# Patient Record
Sex: FEMALE | Race: BLACK OR AFRICAN AMERICAN | NOT HISPANIC OR LATINO | URBAN - METROPOLITAN AREA
[De-identification: names, ages, dates, MRNs, and addresses within clinical notes are randomized per-mention and may not be internally consistent; named-entity substitution may affect disease eponyms.]

---

## 2020-08-31 ENCOUNTER — INPATIENT (INPATIENT)
Facility: HOSPITAL | Age: 65
LOS: 0 days | Discharge: HOME | DRG: 392 | End: 2020-09-01
Attending: GENERAL PRACTICE | Admitting: GENERAL PRACTICE
Payer: COMMERCIAL

## 2020-08-31 VITALS
SYSTOLIC BLOOD PRESSURE: 158 MMHG | RESPIRATION RATE: 16 BRPM | OXYGEN SATURATION: 97 % | WEIGHT: 238.98 LBS | DIASTOLIC BLOOD PRESSURE: 87 MMHG | HEART RATE: 87 BPM | TEMPERATURE: 98 F

## 2020-08-31 DIAGNOSIS — R06.02 SHORTNESS OF BREATH: ICD-10-CM

## 2020-08-31 DIAGNOSIS — R07.9 CHEST PAIN, UNSPECIFIED: ICD-10-CM

## 2020-08-31 DIAGNOSIS — E11.9 TYPE 2 DIABETES MELLITUS WITHOUT COMPLICATIONS: ICD-10-CM

## 2020-08-31 DIAGNOSIS — Z98.891 HISTORY OF UTERINE SCAR FROM PREVIOUS SURGERY: Chronic | ICD-10-CM

## 2020-08-31 DIAGNOSIS — I10 ESSENTIAL (PRIMARY) HYPERTENSION: ICD-10-CM

## 2020-08-31 DIAGNOSIS — Z29.9 ENCOUNTER FOR PROPHYLACTIC MEASURES, UNSPECIFIED: ICD-10-CM

## 2020-08-31 LAB
ALBUMIN SERPL ELPH-MCNC: 3.5 G/DL — SIGNIFICANT CHANGE UP (ref 3.5–5)
ALP SERPL-CCNC: 90 U/L — SIGNIFICANT CHANGE UP (ref 40–120)
ALT FLD-CCNC: 22 U/L DA — SIGNIFICANT CHANGE UP (ref 10–60)
ANION GAP SERPL CALC-SCNC: 5 MMOL/L — SIGNIFICANT CHANGE UP (ref 5–17)
APTT BLD: 27.9 SEC — SIGNIFICANT CHANGE UP (ref 27.5–35.5)
AST SERPL-CCNC: 13 U/L — SIGNIFICANT CHANGE UP (ref 10–40)
BILIRUB SERPL-MCNC: 0.7 MG/DL — SIGNIFICANT CHANGE UP (ref 0.2–1.2)
BUN SERPL-MCNC: 10 MG/DL — SIGNIFICANT CHANGE UP (ref 7–18)
CALCIUM SERPL-MCNC: 9.9 MG/DL — SIGNIFICANT CHANGE UP (ref 8.4–10.5)
CHLORIDE SERPL-SCNC: 104 MMOL/L — SIGNIFICANT CHANGE UP (ref 96–108)
CK MB BLD-MCNC: <0.9 % — SIGNIFICANT CHANGE UP (ref 0–3.5)
CK MB CFR SERPL CALC: <1 NG/ML — SIGNIFICANT CHANGE UP (ref 0–3.6)
CK SERPL-CCNC: 110 U/L — SIGNIFICANT CHANGE UP (ref 21–215)
CO2 SERPL-SCNC: 29 MMOL/L — SIGNIFICANT CHANGE UP (ref 22–31)
CREAT SERPL-MCNC: 0.94 MG/DL — SIGNIFICANT CHANGE UP (ref 0.5–1.3)
GLUCOSE BLDC GLUCOMTR-MCNC: 165 MG/DL — HIGH (ref 70–99)
GLUCOSE BLDC GLUCOMTR-MCNC: 286 MG/DL — HIGH (ref 70–99)
GLUCOSE BLDC GLUCOMTR-MCNC: 352 MG/DL — HIGH (ref 70–99)
GLUCOSE SERPL-MCNC: 219 MG/DL — HIGH (ref 70–99)
HCT VFR BLD CALC: 39.3 % — SIGNIFICANT CHANGE UP (ref 34.5–45)
HGB BLD-MCNC: 12 G/DL — SIGNIFICANT CHANGE UP (ref 11.5–15.5)
INR BLD: 1.17 RATIO — HIGH (ref 0.88–1.16)
MCHC RBC-ENTMCNC: 29.1 PG — SIGNIFICANT CHANGE UP (ref 27–34)
MCHC RBC-ENTMCNC: 30.5 GM/DL — LOW (ref 32–36)
MCV RBC AUTO: 95.2 FL — SIGNIFICANT CHANGE UP (ref 80–100)
NRBC # BLD: 0 /100 WBCS — SIGNIFICANT CHANGE UP (ref 0–0)
NT-PROBNP SERPL-SCNC: 62 PG/ML — SIGNIFICANT CHANGE UP (ref 0–125)
PLATELET # BLD AUTO: 204 K/UL — SIGNIFICANT CHANGE UP (ref 150–400)
POTASSIUM SERPL-MCNC: 3.7 MMOL/L — SIGNIFICANT CHANGE UP (ref 3.5–5.3)
POTASSIUM SERPL-SCNC: 3.7 MMOL/L — SIGNIFICANT CHANGE UP (ref 3.5–5.3)
PROT SERPL-MCNC: 7.8 G/DL — SIGNIFICANT CHANGE UP (ref 6–8.3)
PROTHROM AB SERPL-ACNC: 13.6 SEC — SIGNIFICANT CHANGE UP (ref 10.6–13.6)
RBC # BLD: 4.13 M/UL — SIGNIFICANT CHANGE UP (ref 3.8–5.2)
RBC # FLD: 12.6 % — SIGNIFICANT CHANGE UP (ref 10.3–14.5)
SARS-COV-2 RNA SPEC QL NAA+PROBE: SIGNIFICANT CHANGE UP
SODIUM SERPL-SCNC: 138 MMOL/L — SIGNIFICANT CHANGE UP (ref 135–145)
TROPONIN I SERPL-MCNC: <0.015 NG/ML — SIGNIFICANT CHANGE UP (ref 0–0.04)
TROPONIN I SERPL-MCNC: <0.015 NG/ML — SIGNIFICANT CHANGE UP (ref 0–0.04)
WBC # BLD: 9.48 K/UL — SIGNIFICANT CHANGE UP (ref 3.8–10.5)
WBC # FLD AUTO: 9.48 K/UL — SIGNIFICANT CHANGE UP (ref 3.8–10.5)

## 2020-08-31 PROCEDURE — 71045 X-RAY EXAM CHEST 1 VIEW: CPT | Mod: 26

## 2020-08-31 PROCEDURE — 99285 EMERGENCY DEPT VISIT HI MDM: CPT | Mod: 25

## 2020-08-31 PROCEDURE — 71275 CT ANGIOGRAPHY CHEST: CPT | Mod: 26

## 2020-08-31 RX ORDER — PANTOPRAZOLE SODIUM 20 MG/1
40 TABLET, DELAYED RELEASE ORAL
Refills: 0 | Status: DISCONTINUED | OUTPATIENT
Start: 2020-08-31 | End: 2020-09-01

## 2020-08-31 RX ORDER — INSULIN LISPRO 100/ML
VIAL (ML) SUBCUTANEOUS
Refills: 0 | Status: DISCONTINUED | OUTPATIENT
Start: 2020-08-31 | End: 2020-09-01

## 2020-08-31 RX ORDER — INSULIN LISPRO 100/ML
10 VIAL (ML) SUBCUTANEOUS
Refills: 0 | Status: DISCONTINUED | OUTPATIENT
Start: 2020-08-31 | End: 2020-09-01

## 2020-08-31 RX ORDER — INSULIN LISPRO 100/ML
VIAL (ML) SUBCUTANEOUS AT BEDTIME
Refills: 0 | Status: DISCONTINUED | OUTPATIENT
Start: 2020-08-31 | End: 2020-09-01

## 2020-08-31 RX ORDER — ATORVASTATIN CALCIUM 80 MG/1
40 TABLET, FILM COATED ORAL AT BEDTIME
Refills: 0 | Status: DISCONTINUED | OUTPATIENT
Start: 2020-08-31 | End: 2020-09-01

## 2020-08-31 RX ORDER — ACETAMINOPHEN 500 MG
650 TABLET ORAL EVERY 4 HOURS
Refills: 0 | Status: DISCONTINUED | OUTPATIENT
Start: 2020-08-31 | End: 2020-09-01

## 2020-08-31 RX ORDER — GLUCAGON INJECTION, SOLUTION 0.5 MG/.1ML
1 INJECTION, SOLUTION SUBCUTANEOUS ONCE
Refills: 0 | Status: DISCONTINUED | OUTPATIENT
Start: 2020-08-31 | End: 2020-09-01

## 2020-08-31 RX ORDER — ACETAZOLAMIDE 250 MG/1
500 TABLET ORAL
Refills: 0 | Status: DISCONTINUED | OUTPATIENT
Start: 2020-08-31 | End: 2020-09-01

## 2020-08-31 RX ORDER — ASPIRIN/CALCIUM CARB/MAGNESIUM 324 MG
81 TABLET ORAL DAILY
Refills: 0 | Status: DISCONTINUED | OUTPATIENT
Start: 2020-08-31 | End: 2020-09-01

## 2020-08-31 RX ORDER — KETOROLAC TROMETHAMINE 30 MG/ML
15 SYRINGE (ML) INJECTION EVERY 6 HOURS
Refills: 0 | Status: DISCONTINUED | OUTPATIENT
Start: 2020-08-31 | End: 2020-08-31

## 2020-08-31 RX ORDER — METOPROLOL TARTRATE 50 MG
12.5 TABLET ORAL
Refills: 0 | Status: DISCONTINUED | OUTPATIENT
Start: 2020-08-31 | End: 2020-09-01

## 2020-08-31 RX ORDER — DEXTROSE 50 % IN WATER 50 %
25 SYRINGE (ML) INTRAVENOUS ONCE
Refills: 0 | Status: DISCONTINUED | OUTPATIENT
Start: 2020-08-31 | End: 2020-09-01

## 2020-08-31 RX ORDER — INSULIN GLARGINE 100 [IU]/ML
25 INJECTION, SOLUTION SUBCUTANEOUS AT BEDTIME
Refills: 0 | Status: DISCONTINUED | OUTPATIENT
Start: 2020-08-31 | End: 2020-09-01

## 2020-08-31 RX ORDER — SODIUM CHLORIDE 9 MG/ML
1000 INJECTION, SOLUTION INTRAVENOUS
Refills: 0 | Status: DISCONTINUED | OUTPATIENT
Start: 2020-08-31 | End: 2020-09-01

## 2020-08-31 RX ORDER — ENOXAPARIN SODIUM 100 MG/ML
40 INJECTION SUBCUTANEOUS DAILY
Refills: 0 | Status: DISCONTINUED | OUTPATIENT
Start: 2020-08-31 | End: 2020-09-01

## 2020-08-31 RX ORDER — OXYCODONE AND ACETAMINOPHEN 5; 325 MG/1; MG/1
1 TABLET ORAL EVERY 4 HOURS
Refills: 0 | Status: DISCONTINUED | OUTPATIENT
Start: 2020-08-31 | End: 2020-09-01

## 2020-08-31 RX ORDER — DORZOLAMIDE HYDROCHLORIDE 20 MG/ML
1 SOLUTION/ DROPS OPHTHALMIC THREE TIMES A DAY
Refills: 0 | Status: DISCONTINUED | OUTPATIENT
Start: 2020-08-31 | End: 2020-09-01

## 2020-08-31 RX ORDER — LATANOPROST 0.05 MG/ML
1 SOLUTION/ DROPS OPHTHALMIC; TOPICAL AT BEDTIME
Refills: 0 | Status: DISCONTINUED | OUTPATIENT
Start: 2020-08-31 | End: 2020-09-01

## 2020-08-31 RX ADMIN — DORZOLAMIDE HYDROCHLORIDE 1 DROP(S): 20 SOLUTION/ DROPS OPHTHALMIC at 22:01

## 2020-08-31 RX ADMIN — Medication 3: at 22:00

## 2020-08-31 RX ADMIN — INSULIN GLARGINE 25 UNIT(S): 100 INJECTION, SOLUTION SUBCUTANEOUS at 21:55

## 2020-08-31 RX ADMIN — ATORVASTATIN CALCIUM 40 MILLIGRAM(S): 80 TABLET, FILM COATED ORAL at 22:01

## 2020-08-31 RX ADMIN — ACETAZOLAMIDE 500 MILLIGRAM(S): 250 TABLET ORAL at 18:33

## 2020-08-31 RX ADMIN — LATANOPROST 1 DROP(S): 0.05 SOLUTION/ DROPS OPHTHALMIC; TOPICAL at 21:55

## 2020-08-31 RX ADMIN — Medication 12.5 MILLIGRAM(S): at 18:04

## 2020-08-31 RX ADMIN — Medication 15 MILLIGRAM(S): at 20:00

## 2020-08-31 NOTE — ED ADULT NURSE NOTE - OBJECTIVE STATEMENT
Pt AOx4, ambulatory, c/o left sided chest pain and SOB since friday. Pt denies radiation of pain, dizziness, HA. EKG done, pt placed on cardiac monitor, no signs of distress noted.

## 2020-08-31 NOTE — ED PROVIDER NOTE - OBJECTIVE STATEMENT
66 yo F pmh of GERD, HTN, and DM sent in by PCP for eval of CP and ARCE x a few days. Normally can walk a few blocks, but as of the last few days can only walk half a block. CP pleuritic. Denies other acute complaints.

## 2020-08-31 NOTE — CONSULT NOTE ADULT - ASSESSMENT
65 female with medical history of HTN , DM ( On insulin) HLD presented to ED after she was having pain in middle of chest for 2 days. Patient went to PCP office this am and was sent to ED for further evaluation. Patient reports pain in centre of chest increases when she takes deep breath and non radiating, alleviated by nothing. Patient denies cough, fever, head ache, dizziness, bowel or urinary problems.  pt denies of any exertional symptoms  pt with risk factor for cad, with chest pain  chest pain pleuritic , CTA negative  ?esophagitis add Protonix ?EGD  continue beta blocker  lipid  panel

## 2020-08-31 NOTE — ED PROVIDER NOTE - PROGRESS NOTE DETAILS
EKG - nsr, rate 89, QTc 428, no ectopy, no ischemic changes EKG - nsr, rate 89, QTc 428, no ectopy, no ischemic changes  labs - D dimer 330  CTA - no PE  PCP admits to Dr Garrison. Endorsed to MAR for further eval of CP and ARCE.

## 2020-08-31 NOTE — ED ADULT NURSE NOTE - NSIMPLEMENTINTERV_GEN_ALL_ED
Implemented All Universal Safety Interventions:  [place] to call system. Call bell, personal items and telephone within reach. Instruct patient to call for assistance. Room bathroom lighting operational. Non-slip footwear when patient is off stretcher. Physically safe environment: no spills, clutter or unnecessary equipment. Stretcher in lowest position, wheels locked, appropriate side rails in place.

## 2020-08-31 NOTE — H&P ADULT - ASSESSMENT
65 female with medical history of HTN , DM ( On insulin) HLD presented to ED after she was having pain in middle of chest for 2 days. Patient went to PCP office this am and was sent to ED for further evaluation. Patient reports pain in centre of chest increases when she takes deep breath and non radiating, alleviated by nothing. Patient denies cough, fever, head ache, dizziness, bowel or urinary problems.

## 2020-08-31 NOTE — ED ADULT NURSE NOTE - CHPI ED NUR SYMPTOMS NEG
no vomiting/no back pain/no congestion/no diaphoresis/no dizziness/no nausea/no syncope/no fever/no chills

## 2020-08-31 NOTE — H&P ADULT - PROBLEM SELECTOR PLAN 5
RISK                                                          Points  [] Previous VTE                                           3  [] Thrombophilia                                        2  [] Lower limb paralysis                              2   [] Current Cancer                                       2   [x] Immobilization > 24 hrs                        1  [] ICU/CCU stay > 24 hours                       1  [x] Age > 60                                                   1    Lovenox for DVT PPx

## 2020-08-31 NOTE — H&P ADULT - HISTORY OF PRESENT ILLNESS
65 female with medical history of HTN , DM ( On insulin) HLD presented to ED after she was having pain in middle of chest for 2 days. Patient went to PCP office this am and was sent to ED for further evaluation. Patient reports pain in centre of chest increases when she takes deep breath and non radiating, alleviated by nothing. Patient denies cough, fever, head ache, dizziness, bowel or urinary problems.      ED course:  troponin -ve X 1  EKG showed NSR  CTA done showed 65 female with medical history of HTN , DM ( On insulin) HLD presented to ED after she was having pain in middle of chest for 2 days. Patient went to PCP office this am and was sent to ED for further evaluation. Patient reports pain in centre of chest , pressure like increases when she takes deep breath and non radiating, alleviated by nothing. Patient denies cough, fever, head ache, dizziness, bowel or urinary problems.      ED course:  troponin -ve X 1  EKG showed NSR  CTA done showed

## 2020-08-31 NOTE — ED PROVIDER NOTE - DATE/TIME 1
Nikkie calling from [facility], tel#[phone]..patient is scheduled on 01/19/19 for CT Abdomen/Pelvis w/wo contrast and they need clinical notes and referral faxed over to fax#[phone].  Please fax this as soon as possible or call Nikkie if any questions.  Thank you!   31-Aug-2020 15:31

## 2020-08-31 NOTE — H&P ADULT - PROBLEM SELECTOR PLAN 3
Patient takes 15 units humalog Tid.  takes Lantus 40 at night. will statr the patient on 10 humaloh and 25 Lantus blanco moderate sliding scale. Patient takes 15 units humalog Tid.  takes Lantus 40 at night. will start the patient on 10 Humalog and 25 Lantus blanco moderate sliding scale.

## 2020-08-31 NOTE — CONSULT NOTE ADULT - SUBJECTIVE AND OBJECTIVE BOX
CHIEF COMPLAINT:Patient is a 65y old  Female who presents with a chief complaint of Chest Pain (31 Aug 2020 16:55)      HPI:  65 female with medical history of HTN , DM ( On insulin) HLD presented to ED after she was having pain in middle of chest for 2 days. Patient went to PCP office this am and was sent to ED for further evaluation. Patient reports pain in centre of chest increases when she takes deep breath and non radiating, alleviated by nothing. Patient denies cough, fever, head ache, dizziness, bowel or urinary problems.  pt denies of any exertional symptoms    ED course:  troponin -ve X 1  EKG showed NSR  CTA done showed (31 Aug 2020 16:55)      PAST MEDICAL & SURGICAL HISTORY:  DM (diabetes mellitus)  HTN (hypertension)  H/O:       MEDICATIONS  (STANDING):  acetaZOLAMIDE    Tablet 500 milliGRAM(s) Oral two times a day  aspirin  chewable 81 milliGRAM(s) Oral daily  atorvastatin 40 milliGRAM(s) Oral at bedtime  dextrose 5%. 1000 milliLiter(s) (50 mL/Hr) IV Continuous <Continuous>  dextrose 50% Injectable 25 Gram(s) IV Push once  dicyclomine 10 milliGRAM(s) Oral three times a day before meals  dorzolamide 2% Ophthalmic Solution 1 Drop(s) Both EYES three times a day  enoxaparin Injectable 40 milliGRAM(s) SubCutaneous daily  insulin glargine Injectable (LANTUS) 25 Unit(s) SubCutaneous at bedtime  insulin lispro (HumaLOG) corrective regimen sliding scale   SubCutaneous three times a day before meals  insulin lispro (HumaLOG) corrective regimen sliding scale   SubCutaneous at bedtime  insulin lispro Injectable (HumaLOG) 10 Unit(s) SubCutaneous three times a day before meals  latanoprost 0.005% Ophthalmic Solution 1 Drop(s) Both EYES at bedtime  metoprolol tartrate 12.5 milliGRAM(s) Oral two times a day  pantoprazole    Tablet 40 milliGRAM(s) Oral before breakfast    MEDICATIONS  (PRN):  acetaminophen   Tablet .. 650 milliGRAM(s) Oral every 4 hours PRN Mild Pain (1 - 3)  glucagon  Injectable 1 milliGRAM(s) IntraMuscular once PRN Glucose LESS THAN 70 milligrams/deciliter  ketorolac   Injectable 15 milliGRAM(s) IV Push every 6 hours PRN Severe Pain (7 - 10)  oxycodone    5 mG/acetaminophen 325 mG 1 Tablet(s) Oral every 4 hours PRN Moderate Pain (4 - 6)      FAMILY HISTORY:      SOCIAL HISTORY:    [ ] Non-smoker  [ ] Smoker  [ ] Alcohol    Allergies    No Known Allergies    Intolerances    	    REVIEW OF SYSTEMS:  CONSTITUTIONAL: No fever, weight loss, or fatigue  EYES: No eye pain, visual disturbances, or discharge  ENT:  No difficulty hearing, tinnitus, vertigo; No sinus or throat pain  NECK: No pain or stiffness  RESPIRATORY: No cough, wheezing, chills or hemoptysis; No Shortness of Breath  CARDIOVASCULAR: + chest pain, no palpitations, passing out, dizziness, or leg swelling  GASTROINTESTINAL: No abdominal or epigastric pain. No nausea, vomiting, or hematemesis; No diarrhea or constipation. No melena or hematochezia.  GENITOURINARY: No dysuria, frequency, hematuria, or incontinence  NEUROLOGICAL: No headaches, memory loss, loss of strength, numbness, or tremors  SKIN: No itching, burning, rashes, or lesions   LYMPH Nodes: No enlarged glands  ENDOCRINE: No heat or cold intolerance; No hair loss  MUSCULOSKELETAL: No joint pain or swelling; No muscle, back, or extremity pain  PSYCHIATRIC: No depression, anxiety, mood swings, or difficulty sleeping  HEME/LYMPH: No easy bruising, or bleeding gums  ALLERGY AND IMMUNOLOGIC: No hives or eczema	    [ ] All others negative	  [ ] Unable to obtain    PHYSICAL EXAM:  T(C): 36.4 (20 @ 15:25), Max: 36.6 (20 @ 10:58)  HR: 79 (20 @ 20:27) (79 - 87)  BP: 138/78 (20 @ 20:27) (138/78 - 158/87)  RR: 18 (20 @ 20:27) (16 - 18)  SpO2: 96% (20 @ 20:27) (96% - 99%)  Wt(kg): --  I&O's Summary      Appearance: Normal	  HEENT:   Normal oral mucosa, PERRL, EOMI	  Lymphatic: No lymphadenopathy  Cardiovascular: Normal S1 S2, No JVD, + murmurs, No edema  Respiratory: Lungs clear to auscultation	  Psychiatry: A & O x 3, Mood & affect appropriate  Gastrointestinal:  Soft, Non-tender, + BS	  Skin: No rashes, No ecchymoses, No cyanosis	  Neurologic: Non-focal  Extremities: Normal range of motion, No clubbing, cyanosis or edema  Vascular: Peripheral pulses palpable 2+ bilaterally    TELEMETRY: 	    ECG:  	  RADIOLOGY:  OTHER: 	  	  LABS:	 	    CARDIAC MARKERS:  CARDIAC MARKERS ( 31 Aug 2020 18:20 )  <0.015 ng/mL / x     / 110 U/L / x     / <1.0 ng/mL  CARDIAC MARKERS ( 31 Aug 2020 11:41 )  <0.015 ng/mL / x     / x     / x     / x                                  12.0   9.48  )-----------( 204      ( 31 Aug 2020 11:41 )             39.3         138  |  104  |  10  ----------------------------<  219<H>  3.7   |  29  |  0.94    Ca    9.9      31 Aug 2020 11:41    TPro  7.8  /  Alb  3.5  /  TBili  0.7  /  DBili  x   /  AST  13  /  ALT  22  /  AlkPhos  90      proBNP: Serum Pro-Brain Natriuretic Peptide: 62 pg/mL ( @ 11:41)    Lipid Profile:   HgA1c:   TSH:   PT/INR - ( 31 Aug 2020 11:41 )   PT: 13.6 sec;   INR: 1.17 ratio         PTT - ( 31 Aug 2020 11:41 )  PTT:27.9 sec    PREVIOUS DIAGNOSTIC TESTING:      < from: CT Angio Chest w/ IV Cont (20 @ 15:01) >  PROCEDURE:  CT Angiography of the Chest.  90 ml of Omnipaque 350 was injected intravenously. 10 ml were discarded.  Sagittal and coronal reformats were performed as well as 3D (MIP) reconstructions.    FINDINGS:    LUNGS AND AIRWAYS: Patent central airways.  Areas of atelectasis or scarring. No lung consolidations.  PLEURA: No pleural effusion.  MEDIASTINUM AND DEANN: No lymphadenopathy. Esophageal wall thickening.  VESSELS: Mild atherosclerotic changes of thoracic aorta and coronary arteries. No thoracic aortic aneurysm or dissection. Multiple images are degraded byrespiratory motion. Excluding regions with motion artifact, there are no pulmonary arterial filling defects identified.  HEART: Heart size is normal. No pericardial effusion.  CHEST WALL AND LOWER NECK: Within normal limits.  VISUALIZED UPPER ABDOMEN: Colonic diverticulosis.  BONES: Degenerative changes.    IMPRESSION:  Negative for pulmonary embolism.  Esophageal wall thickening, question esophagitis.    < from: Xray Chest 1 View- PORTABLE-Urgent (20 @ 13:03) >  IMPRESSION: No evidence for focal infiltrate or lobar consolidation.

## 2020-08-31 NOTE — H&P ADULT - ATTENDING COMMENTS
Patient evaluated, case discussed with me, chart reviewed, agree with above H/P as reviewed and edited by me.  Dr. JORGE Garrison ([phone])

## 2020-08-31 NOTE — H&P ADULT - PROBLEM SELECTOR PLAN 2
Patient ahs Hx of HTN.  takes acetazolamide 500,   Will start the patient on bblocker Patient has Hx of HTN.  takes acetazolamide 500, will continue with home meds  Will start the patient on bblocker

## 2020-08-31 NOTE — H&P ADULT - PROBLEM SELECTOR PLAN 1
Patient presented with chest pain.  trop 1 negative follow T2 t3  CTA showed question esophagitis.  will start the patient on aspirin , bblocker and statin.  follow ECHO Patient presented with chest pain.  trop 1 negative X 2 follow T3  CTA showed question esophagitis. Trial of protonix  will start the patient on aspirin , bblocker and statin.  follow ECHO and stress test.  Cardiology Dr Mchugh

## 2020-08-31 NOTE — H&P ADULT - NSHPPHYSICALEXAM_GEN_ALL_CORE
Vital Signs Last 24 Hrs  T(C): 36.4 (31 Aug 2020 15:25), Max: 36.6 (31 Aug 2020 10:58)  T(F): 97.6 (31 Aug 2020 15:25), Max: 97.8 (31 Aug 2020 10:58)  HR: 85 (31 Aug 2020 15:25) (85 - 87)  BP: 141/79 (31 Aug 2020 15:25) (141/79 - 158/87)  BP(mean): --  RR: 16 (31 Aug 2020 15:25) (16 - 16)  SpO2: 99% (31 Aug 2020 15:25) (97% - 99%)    GENERAL: obese female in NAD  HEAD:  Atraumatic, Normocephalic  EYES: EOMI, PERRLA, conjunctiva and sclera clear  NECK: Supple, No JVD  CHEST/LUNG: Clear to auscultation bilaterally; No wheeze; No crackles; No accessory muscles used  HEART: Regular rate and rhythm; No murmurs;   ABDOMEN: Soft, Nontender, Nondistended; Bowel sounds present; No guarding  EXTREMITIES:  2+ Peripheral Pulses, No cyanosis or edema  PSYCH:  Normal Affect  NEUROLOGY: non-focal, AAO X 3. Strength is 5/5. no sensory loss.  SKIN: No rashes or lesions

## 2020-09-01 ENCOUNTER — TRANSCRIPTION ENCOUNTER (OUTPATIENT)
Age: 65
End: 2020-09-01

## 2020-09-01 VITALS
RESPIRATION RATE: 18 BRPM | TEMPERATURE: 98 F | DIASTOLIC BLOOD PRESSURE: 81 MMHG | OXYGEN SATURATION: 97 % | HEART RATE: 82 BPM | SYSTOLIC BLOOD PRESSURE: 132 MMHG

## 2020-09-01 LAB
A1C WITH ESTIMATED AVERAGE GLUCOSE RESULT: 6.4 % — HIGH (ref 4–5.6)
ALBUMIN SERPL ELPH-MCNC: 3 G/DL — LOW (ref 3.5–5)
ALP SERPL-CCNC: 80 U/L — SIGNIFICANT CHANGE UP (ref 40–120)
ALT FLD-CCNC: 19 U/L DA — SIGNIFICANT CHANGE UP (ref 10–60)
ANION GAP SERPL CALC-SCNC: 5 MMOL/L — SIGNIFICANT CHANGE UP (ref 5–17)
AST SERPL-CCNC: 12 U/L — SIGNIFICANT CHANGE UP (ref 10–40)
BASOPHILS # BLD AUTO: 0.03 K/UL — SIGNIFICANT CHANGE UP (ref 0–0.2)
BASOPHILS NFR BLD AUTO: 0.4 % — SIGNIFICANT CHANGE UP (ref 0–2)
BILIRUB SERPL-MCNC: 0.9 MG/DL — SIGNIFICANT CHANGE UP (ref 0.2–1.2)
BUN SERPL-MCNC: 11 MG/DL — SIGNIFICANT CHANGE UP (ref 7–18)
CALCIUM SERPL-MCNC: 9.7 MG/DL — SIGNIFICANT CHANGE UP (ref 8.4–10.5)
CHLORIDE SERPL-SCNC: 105 MMOL/L — SIGNIFICANT CHANGE UP (ref 96–108)
CHOLEST SERPL-MCNC: 175 MG/DL — SIGNIFICANT CHANGE UP (ref 10–199)
CK MB BLD-MCNC: <1.3 % — SIGNIFICANT CHANGE UP (ref 0–3.5)
CK MB CFR SERPL CALC: <1 NG/ML — SIGNIFICANT CHANGE UP (ref 0–3.6)
CK SERPL-CCNC: 78 U/L — SIGNIFICANT CHANGE UP (ref 21–215)
CO2 SERPL-SCNC: 27 MMOL/L — SIGNIFICANT CHANGE UP (ref 22–31)
CREAT SERPL-MCNC: 0.96 MG/DL — SIGNIFICANT CHANGE UP (ref 0.5–1.3)
EOSINOPHIL # BLD AUTO: 0.06 K/UL — SIGNIFICANT CHANGE UP (ref 0–0.5)
EOSINOPHIL NFR BLD AUTO: 0.7 % — SIGNIFICANT CHANGE UP (ref 0–6)
ESTIMATED AVERAGE GLUCOSE: 137 MG/DL — HIGH (ref 68–114)
FOLATE SERPL-MCNC: 19.3 NG/ML — SIGNIFICANT CHANGE UP
GLUCOSE BLDC GLUCOMTR-MCNC: 162 MG/DL — HIGH (ref 70–99)
GLUCOSE BLDC GLUCOMTR-MCNC: 194 MG/DL — HIGH (ref 70–99)
GLUCOSE BLDC GLUCOMTR-MCNC: 248 MG/DL — HIGH (ref 70–99)
GLUCOSE SERPL-MCNC: 207 MG/DL — HIGH (ref 70–99)
HCT VFR BLD CALC: 36 % — SIGNIFICANT CHANGE UP (ref 34.5–45)
HCV AB S/CO SERPL IA: 0.08 S/CO — SIGNIFICANT CHANGE UP (ref 0–0.99)
HCV AB SERPL-IMP: SIGNIFICANT CHANGE UP
HDLC SERPL-MCNC: 67 MG/DL — SIGNIFICANT CHANGE UP
HGB BLD-MCNC: 10.9 G/DL — LOW (ref 11.5–15.5)
IMM GRANULOCYTES NFR BLD AUTO: 0.4 % — SIGNIFICANT CHANGE UP (ref 0–1.5)
LIPID PNL WITH DIRECT LDL SERPL: 94 MG/DL — SIGNIFICANT CHANGE UP
LYMPHOCYTES # BLD AUTO: 1.69 K/UL — SIGNIFICANT CHANGE UP (ref 1–3.3)
LYMPHOCYTES # BLD AUTO: 19.7 % — SIGNIFICANT CHANGE UP (ref 13–44)
MAGNESIUM SERPL-MCNC: 2.3 MG/DL — SIGNIFICANT CHANGE UP (ref 1.6–2.6)
MCHC RBC-ENTMCNC: 29.1 PG — SIGNIFICANT CHANGE UP (ref 27–34)
MCHC RBC-ENTMCNC: 30.3 GM/DL — LOW (ref 32–36)
MCV RBC AUTO: 96.3 FL — SIGNIFICANT CHANGE UP (ref 80–100)
MONOCYTES # BLD AUTO: 0.88 K/UL — SIGNIFICANT CHANGE UP (ref 0–0.9)
MONOCYTES NFR BLD AUTO: 10.3 % — SIGNIFICANT CHANGE UP (ref 2–14)
NEUTROPHILS # BLD AUTO: 5.88 K/UL — SIGNIFICANT CHANGE UP (ref 1.8–7.4)
NEUTROPHILS NFR BLD AUTO: 68.5 % — SIGNIFICANT CHANGE UP (ref 43–77)
NRBC # BLD: 0 /100 WBCS — SIGNIFICANT CHANGE UP (ref 0–0)
PHOSPHATE SERPL-MCNC: 2.6 MG/DL — SIGNIFICANT CHANGE UP (ref 2.5–4.5)
PLATELET # BLD AUTO: 197 K/UL — SIGNIFICANT CHANGE UP (ref 150–400)
POTASSIUM SERPL-MCNC: 3.5 MMOL/L — SIGNIFICANT CHANGE UP (ref 3.5–5.3)
POTASSIUM SERPL-SCNC: 3.5 MMOL/L — SIGNIFICANT CHANGE UP (ref 3.5–5.3)
PROT SERPL-MCNC: 7.2 G/DL — SIGNIFICANT CHANGE UP (ref 6–8.3)
RBC # BLD: 3.74 M/UL — LOW (ref 3.8–5.2)
RBC # FLD: 12.6 % — SIGNIFICANT CHANGE UP (ref 10.3–14.5)
SARS-COV-2 IGG SERPL QL IA: NEGATIVE — SIGNIFICANT CHANGE UP
SARS-COV-2 IGM SERPL IA-ACNC: <0.1 INDEX — SIGNIFICANT CHANGE UP
SODIUM SERPL-SCNC: 137 MMOL/L — SIGNIFICANT CHANGE UP (ref 135–145)
TOTAL CHOLESTEROL/HDL RATIO MEASUREMENT: 2.6 RATIO — LOW (ref 3.3–7.1)
TRIGL SERPL-MCNC: 69 MG/DL — SIGNIFICANT CHANGE UP (ref 10–149)
TROPONIN I SERPL-MCNC: <0.015 NG/ML — SIGNIFICANT CHANGE UP (ref 0–0.04)
TSH SERPL-MCNC: 0.95 UU/ML — SIGNIFICANT CHANGE UP (ref 0.34–4.82)
VIT B12 SERPL-MCNC: 627 PG/ML — SIGNIFICANT CHANGE UP (ref 232–1245)
WBC # BLD: 8.57 K/UL — SIGNIFICANT CHANGE UP (ref 3.8–10.5)
WBC # FLD AUTO: 8.57 K/UL — SIGNIFICANT CHANGE UP (ref 3.8–10.5)

## 2020-09-01 PROCEDURE — 99285 EMERGENCY DEPT VISIT HI MDM: CPT | Mod: 25

## 2020-09-01 PROCEDURE — A9502: CPT

## 2020-09-01 PROCEDURE — 82607 VITAMIN B-12: CPT

## 2020-09-01 PROCEDURE — 71045 X-RAY EXAM CHEST 1 VIEW: CPT

## 2020-09-01 PROCEDURE — 93005 ELECTROCARDIOGRAM TRACING: CPT

## 2020-09-01 PROCEDURE — 80061 LIPID PANEL: CPT

## 2020-09-01 PROCEDURE — 80053 COMPREHEN METABOLIC PANEL: CPT

## 2020-09-01 PROCEDURE — 87635 SARS-COV-2 COVID-19 AMP PRB: CPT

## 2020-09-01 PROCEDURE — 71275 CT ANGIOGRAPHY CHEST: CPT

## 2020-09-01 PROCEDURE — 78452 HT MUSCLE IMAGE SPECT MULT: CPT

## 2020-09-01 PROCEDURE — 85730 THROMBOPLASTIN TIME PARTIAL: CPT

## 2020-09-01 PROCEDURE — 36415 COLL VENOUS BLD VENIPUNCTURE: CPT

## 2020-09-01 PROCEDURE — 82746 ASSAY OF FOLIC ACID SERUM: CPT

## 2020-09-01 PROCEDURE — 78452 HT MUSCLE IMAGE SPECT MULT: CPT | Mod: 26

## 2020-09-01 PROCEDURE — 84484 ASSAY OF TROPONIN QUANT: CPT

## 2020-09-01 PROCEDURE — 82962 GLUCOSE BLOOD TEST: CPT

## 2020-09-01 PROCEDURE — 83880 ASSAY OF NATRIURETIC PEPTIDE: CPT

## 2020-09-01 PROCEDURE — 82550 ASSAY OF CK (CPK): CPT

## 2020-09-01 PROCEDURE — 86803 HEPATITIS C AB TEST: CPT

## 2020-09-01 PROCEDURE — 84443 ASSAY THYROID STIM HORMONE: CPT

## 2020-09-01 PROCEDURE — 93016 CV STRESS TEST SUPVJ ONLY: CPT

## 2020-09-01 PROCEDURE — 83036 HEMOGLOBIN GLYCOSYLATED A1C: CPT

## 2020-09-01 PROCEDURE — 85027 COMPLETE CBC AUTOMATED: CPT

## 2020-09-01 PROCEDURE — 86769 SARS-COV-2 COVID-19 ANTIBODY: CPT

## 2020-09-01 PROCEDURE — 85379 FIBRIN DEGRADATION QUANT: CPT

## 2020-09-01 PROCEDURE — 85610 PROTHROMBIN TIME: CPT

## 2020-09-01 PROCEDURE — 83735 ASSAY OF MAGNESIUM: CPT

## 2020-09-01 PROCEDURE — 93018 CV STRESS TEST I&R ONLY: CPT

## 2020-09-01 PROCEDURE — 93306 TTE W/DOPPLER COMPLETE: CPT

## 2020-09-01 PROCEDURE — 84100 ASSAY OF PHOSPHORUS: CPT

## 2020-09-01 PROCEDURE — 93017 CV STRESS TEST TRACING ONLY: CPT

## 2020-09-01 PROCEDURE — 82553 CREATINE MB FRACTION: CPT

## 2020-09-01 RX ORDER — DORZOLAMIDE HYDROCHLORIDE 20 MG/ML
1 SOLUTION/ DROPS OPHTHALMIC
Qty: 0 | Refills: 0 | DISCHARGE

## 2020-09-01 RX ORDER — SITAGLIPTIN AND METFORMIN HYDROCHLORIDE 500; 50 MG/1; MG/1
1 TABLET, FILM COATED ORAL
Qty: 0 | Refills: 0 | DISCHARGE

## 2020-09-01 RX ORDER — LATANOPROST 0.05 MG/ML
1 SOLUTION/ DROPS OPHTHALMIC; TOPICAL
Qty: 0 | Refills: 0 | DISCHARGE

## 2020-09-01 RX ORDER — BRIMONIDINE TARTRATE, TIMOLOL MALEATE 2; 5 MG/ML; MG/ML
1 SOLUTION/ DROPS OPHTHALMIC
Qty: 0 | Refills: 0 | DISCHARGE

## 2020-09-01 RX ORDER — ATORVASTATIN CALCIUM 80 MG/1
1 TABLET, FILM COATED ORAL
Qty: 0 | Refills: 0 | DISCHARGE

## 2020-09-01 RX ORDER — ASPIRIN/CALCIUM CARB/MAGNESIUM 324 MG
1 TABLET ORAL
Qty: 30 | Refills: 0
Start: 2020-09-01 | End: 2020-09-30

## 2020-09-01 RX ORDER — OMEPRAZOLE 10 MG/1
1 CAPSULE, DELAYED RELEASE ORAL
Qty: 0 | Refills: 0 | DISCHARGE

## 2020-09-01 RX ORDER — ACETAZOLAMIDE 250 MG/1
1 TABLET ORAL
Qty: 0 | Refills: 0 | DISCHARGE

## 2020-09-01 RX ORDER — NORTRIPTYLINE HYDROCHLORIDE 10 MG/1
1 CAPSULE ORAL
Qty: 0 | Refills: 0 | DISCHARGE

## 2020-09-01 RX ADMIN — Medication 2: at 07:46

## 2020-09-01 RX ADMIN — PANTOPRAZOLE SODIUM 40 MILLIGRAM(S): 20 TABLET, DELAYED RELEASE ORAL at 06:37

## 2020-09-01 RX ADMIN — Medication 10 MILLIGRAM(S): at 06:36

## 2020-09-01 RX ADMIN — Medication 10 UNIT(S): at 17:44

## 2020-09-01 RX ADMIN — Medication 10 MILLIGRAM(S): at 17:43

## 2020-09-01 RX ADMIN — ACETAZOLAMIDE 500 MILLIGRAM(S): 250 TABLET ORAL at 05:26

## 2020-09-01 RX ADMIN — DORZOLAMIDE HYDROCHLORIDE 1 DROP(S): 20 SOLUTION/ DROPS OPHTHALMIC at 05:26

## 2020-09-01 RX ADMIN — Medication 10 UNIT(S): at 11:46

## 2020-09-01 RX ADMIN — Medication 81 MILLIGRAM(S): at 11:42

## 2020-09-01 RX ADMIN — Medication 10 UNIT(S): at 07:46

## 2020-09-01 RX ADMIN — Medication 10 MILLIGRAM(S): at 11:41

## 2020-09-01 RX ADMIN — Medication 1: at 11:46

## 2020-09-01 RX ADMIN — ENOXAPARIN SODIUM 40 MILLIGRAM(S): 100 INJECTION SUBCUTANEOUS at 11:42

## 2020-09-01 RX ADMIN — DORZOLAMIDE HYDROCHLORIDE 1 DROP(S): 20 SOLUTION/ DROPS OPHTHALMIC at 14:18

## 2020-09-01 RX ADMIN — Medication 1: at 17:44

## 2020-09-01 RX ADMIN — ACETAZOLAMIDE 500 MILLIGRAM(S): 250 TABLET ORAL at 17:43

## 2020-09-01 RX ADMIN — Medication 12.5 MILLIGRAM(S): at 17:44

## 2020-09-01 NOTE — MEDICAL STUDENT ADULT H&P (EDUCATION) - NS MD HP STUD ASPLAN ASSES FT
Pt is a 64 yo female w. cc: "pain here (points at L lower rib cage) when I breathe".     1. Chest pain w/ breathing  2. SOB  3. HTN  4. DM  5. DVT prophylaxis

## 2020-09-01 NOTE — MEDICAL STUDENT ADULT H&P (EDUCATION) - NS MD HP STUD MED REC FT
Home Medications:  acetaZOLAMIDE 500 mg oral capsule, extended release: 1 cap(s) orally once a day (01 Sep 2020 11:03)  Combigan 0.2%-0.5% ophthalmic solution: 1 drop(s) to each affected eye every 12 hours (01 Sep 2020 11:03)  dicyclomine 10 mg oral capsule: 1 cap(s) orally 3 times a day (with meals) (01 Sep 2020 11:03)  dorzolamide 2% ophthalmic solution: 1 drop(s) to each affected eye 3 times a day (01 Sep 2020 11:03)  Janumet 50 mg-500 mg oral tablet: 1 tab(s) orally 2 times a day (01 Sep 2020 11:03)  latanoprost 0.005% ophthalmic solution: 1 drop(s) to each affected eye once a day (in the evening) (01 Sep 2020 11:03)  Lipitor 40 mg oral tablet: 1 tab(s) orally once a day (01 Sep 2020 11:03)  methazolAMIDE 50 mg oral tablet: 1 tab(s) orally 2 times a day (01 Sep 2020 11:03)  nortriptyline 10 mg oral capsule: 1 tab(s) orally once a day (at bedtime) (01 Sep 2020 11:03)  omeprazole 40 mg oral delayed release capsule: 1 cap(s) orally once a day (01 Sep 2020 11:03)

## 2020-09-01 NOTE — MEDICAL STUDENT ADULT H&P (EDUCATION) - NS MD HP STUD HX OF PRESENT ILLNESS FT
Pt is a 64 yo female w. cc: "pain here (points at lower rib cage) when I breathe". She was having pain in the middle of her chest for 2 days when she saw her PCP this morning who referred her to the ED. Pt describes the pain as pressure when she takes a deep breath. It does not radiate. Nothing makes it better or worse. Pt denies cough, SOB, palpitations, fevers, bowel or urinary problems.     In the emergency room, trops were -ve x1, EKG showed NSR, and CXR and CTA was done. CXR showed no evidence for focal infiltrate or lobar consolidation. Pt is a 66 yo female w. cc: "pain here (points at L lower rib cage) when I breathe". She was having pain in the middle of her chest for 2 days when she saw her PCP this morning who referred her to the ED. Pt describes the pain as pressure when she takes a deep breath. It does not radiate. Nothing makes it better or worse. Pt denies cough, SOB, palpitations, fevers, bowel or urinary problems.     In the emergency room, trops were -ve x1, EKG showed NSR, and CXR and CTA was done. CXR showed no evidence for focal infiltrate or lobar consolidation. CTA was negative for PE, esophageal wall thickening, with question of esophagitis.

## 2020-09-01 NOTE — MEDICAL STUDENT ADULT H&P (EDUCATION) - NS MD HP STUD RESULTS OTHER FT
Sodium, Serum: 138, [135 - 145 mmol/L]  Potassium, Serum: 3.7, [3.5 - 5.3 mmol/L]  Chloride, Serum: 104, [96 - 108 mmol/L]  Carbon Dioxide, Serum: 29, [22 - 31 mmol/L]  Anion Gap, Serum: 5, [5 - 17 mmol/L]  Blood Urea Nitrogen, Serum: 10, [7 - 18 mg/dL]  Creatinine, Serum: 0.94, [0.50 - 1.30 mg/dL]  Glucose, Serum:   219, [70 - 99 mg/dL]  Calcium, Total Serum: 9.9, [8.4 - 10.5 mg/dL]  Protein Total, Serum: 7.8, [6.0 - 8.3 g/dL]  Albumin, Serum: 3.5, [3.5 - 5.0 g/dL]  Bilirubin Total, Serum: 0.7, [0.2 - 1.2 mg/dL]  Alkaline Phosphatase, Serum: 90, [40 - 120 U/L]  Aspartate Aminotransferase (AST/SGOT): 13, [10 - 40 U/L]  Alanine Aminotransferase (ALT/SGPT): 22, [10 - 60 U/L DA]  eGFR if Non : 64, [>=60 mL/min/1.73M2]  eGFR if : 74, [>=60 mL/min/1.73M2]  Serum Pro-Brain Natriuretic Peptide: 62, [0 - 125 pg/mL]

## 2020-09-01 NOTE — MEDICAL STUDENT ADULT H&P (EDUCATION) - NS MD HP STUD PE GI FT
normal bowel sounds were auscultated in all four quadrants, no tenderness in all 4 quadrants. no abdominal bruits auscultated. negative irizarry's, mcburney's, rebound tenderness.

## 2020-09-01 NOTE — DISCHARGE NOTE NURSING/CASE MANAGEMENT/SOCIAL WORK - PATIENT PORTAL LINK FT
You can access the FollowMyHealth Patient Portal offered by [facility] by registering at the following website: [email or web address]. By joining [facility]’s FollowMyHealth portal, you will also be able to view your health information using other applications (apps) compatible with our system.

## 2020-09-01 NOTE — PROGRESS NOTE ADULT - PROBLEM SELECTOR PLAN 1
Patient presented with chest pain.  trop negative X  3  CTA showed question esophagitis. Trial of Protonix  C/w  aspirin , b blocker and statin.  ECHO and stress test.  Cardiology Dr Mchugh Patient presented with pleuritic like chest pain  trop negative X  3  CTA showed question esophagitis  C/w Protonix  C/w  aspirin , b blocker and statin.  ECHO and stress test.  Cardiology Dr Mchugh Patient presented with pleuritic like chest pain  CXR -ve   Trop negative X 3, EKG NSR  CTA chest -ve for PE, esophagitis  C/w Protonix  C/w aspirin, b blocker and statin  Echo EF >55% GIDD, mild TR/ME   Stress test -ve   Pro BNP 62  Cardiology Dr Mchugh

## 2020-09-01 NOTE — MEDICAL STUDENT ADULT H&P (EDUCATION) - NS MD HP STUD PE EXTREMITIES FT
+2 pulses in upper and lower extremities. Digits are warm to touch. No cyanosis noted. capillary refill is 2+.

## 2020-09-01 NOTE — MEDICAL STUDENT ADULT H&P (EDUCATION) - NS MD HP STUD PE VITALS FT
31-Aug-2020 10:58  · Temp (F): 97.8  · Temp (C) Temp (C): 36.6  · Temp site Temp Site: oral  · Heart Rate Heart Rate (beats/min): 87  · BP Systolic Systolic: 158  · BP Diastolic Diastolic (mm Hg): 87  · Respiration Rate (breaths/min) Respiration Rate (breaths/min): 16  · SpO2 (%) SpO2 (%): 97  · O2 Delivery/Oxygen Delivery Method Patient On (Patient Delivery Method): room air  · Dosing Weight (KILOGRAMS) Dosing Weight (KILOGRAMS): 108.4  · Dosing Weight  (POUNDS) Dosing Weight (POUNDS): 238.9  · SpO2 (%) SpO2 (%): 97

## 2020-09-01 NOTE — PROGRESS NOTE ADULT - SUBJECTIVE AND OBJECTIVE BOX
CARDIOLOGY     PROGRESS  NOTE   ________________________________________________    CHIEF COMPLAINT:Patient is a 65y old  Female who presents with a chief complaint of Chest Pain (01 Sep 2020 08:43)  stiull c/o pleuritic chest pain and exertional pain.  	  REVIEW OF SYSTEMS:  CONSTITUTIONAL: No fever, weight loss, or fatigue  EYES: No eye pain, visual disturbances, or discharge  ENT:  No difficulty hearing, tinnitus, vertigo; No sinus or throat pain  NECK: No pain or stiffness  RESPIRATORY: No cough, wheezing, chills or hemoptysis; No Shortness of Breath  CARDIOVASCULAR: No chest pain, palpitations, passing out, dizziness, or leg swelling  GASTROINTESTINAL: No abdominal or epigastric pain. No nausea, vomiting, or hematemesis; No diarrhea or constipation. No melena or hematochezia.  GENITOURINARY: No dysuria, frequency, hematuria, or incontinence  NEUROLOGICAL: No headaches, memory loss, loss of strength, numbness, or tremors  SKIN: No itching, burning, rashes, or lesions   LYMPH Nodes: No enlarged glands  ENDOCRINE: No heat or cold intolerance; No hair loss  MUSCULOSKELETAL: No joint pain or swelling; No muscle, back, or extremity pain  PSYCHIATRIC: No depression, anxiety, mood swings, or difficulty sleeping  HEME/LYMPH: No easy bruising, or bleeding gums  ALLERGY AND IMMUNOLOGIC: No hives or eczema	    [ ] All others negative	  [ ] Unable to obtain    PHYSICAL EXAM:  T(C): 36.7 (09-01-20 @ 10:47), Max: 36.9 (09-01-20 @ 05:13)  HR: 73 (09-01-20 @ 10:47) (65 - 85)  BP: 129/78 (09-01-20 @ 10:47) (122/52 - 141/79)  RR: 18 (09-01-20 @ 10:47) (16 - 18)  SpO2: 97% (09-01-20 @ 10:47) (96% - 99%)  Wt(kg): --  I&O's Summary      Appearance: Normal	  HEENT:   Normal oral mucosa, PERRL, EOMI	  Lymphatic: No lymphadenopathy  Cardiovascular: Normal S1 S2, No JVD, + murmurs, No edema  Respiratory: Lungs clear to auscultation	  Psychiatry: A & O x 3, Mood & affect appropriate  Gastrointestinal:  Soft, Non-tender, + BS	  Skin: No rashes, No ecchymoses, No cyanosis	  Neurologic: Non-focal  Extremities: Normal range of motion, No clubbing, cyanosis or edema  Vascular: Peripheral pulses palpable 2+ bilaterally    MEDICATIONS  (STANDING):  acetaZOLAMIDE    Tablet 500 milliGRAM(s) Oral two times a day  aspirin  chewable 81 milliGRAM(s) Oral daily  atorvastatin 40 milliGRAM(s) Oral at bedtime  dextrose 5%. 1000 milliLiter(s) (50 mL/Hr) IV Continuous <Continuous>  dextrose 50% Injectable 25 Gram(s) IV Push once  dicyclomine 10 milliGRAM(s) Oral three times a day before meals  dorzolamide 2% Ophthalmic Solution 1 Drop(s) Both EYES three times a day  enoxaparin Injectable 40 milliGRAM(s) SubCutaneous daily  insulin glargine Injectable (LANTUS) 25 Unit(s) SubCutaneous at bedtime  insulin lispro (HumaLOG) corrective regimen sliding scale   SubCutaneous three times a day before meals  insulin lispro (HumaLOG) corrective regimen sliding scale   SubCutaneous at bedtime  insulin lispro Injectable (HumaLOG) 10 Unit(s) SubCutaneous three times a day before meals  latanoprost 0.005% Ophthalmic Solution 1 Drop(s) Both EYES at bedtime  metoprolol tartrate 12.5 milliGRAM(s) Oral two times a day  pantoprazole    Tablet 40 milliGRAM(s) Oral before breakfast      TELEMETRY: 	    ECG:  	  RADIOLOGY:  OTHER: 	  	  LABS:	 	    CARDIAC MARKERS:  CARDIAC MARKERS ( 01 Sep 2020 07:32 )  <0.015 ng/mL / x     / 78 U/L / x     / <1.0 ng/mL  CARDIAC MARKERS ( 31 Aug 2020 18:20 )  <0.015 ng/mL / x     / 110 U/L / x     / <1.0 ng/mL  CARDIAC MARKERS ( 31 Aug 2020 11:41 )  <0.015 ng/mL / x     / x     / x     / x                                    10.9   8.57  )-----------( 197      ( 01 Sep 2020 07:32 )             36.0     09-01    137  |  105  |  11  ----------------------------<  207<H>  3.5   |  27  |  0.96    Ca    9.7      01 Sep 2020 07:32  Phos  2.6     09-01  Mg     2.3     09-01    TPro  7.2  /  Alb  3.0<L>  /  TBili  0.9  /  DBili  x   /  AST  12  /  ALT  19  /  AlkPhos  80  09-01    proBNP: Serum Pro-Brain Natriuretic Peptide: 62 pg/mL (08-31 @ 11:41)    Lipid Profile: Cholesterol 175  LDL 94  HDL 67  TG 69    HgA1c:   TSH: Thyroid Stimulating Hormone, Serum: 0.95 uU/mL (09-01 @ 07:32)    PT/INR - ( 31 Aug 2020 11:41 )   PT: 13.6 sec;   INR: 1.17 ratio         PTT - ( 31 Aug 2020 11:41 )  PTT:27.9 sec      Assessment and plan  ---------------------------  65 female with medical history of HTN , DM ( On insulin) HLD presented to ED after she was having pain in middle of chest for 2 days. Patient went to PCP office this am and was sent to ED for further evaluation. Patient reports pain in centre of chest increases when she takes deep breath and non radiating, alleviated by nothing. Patient denies cough, fever, head ache, dizziness, bowel or urinary problems.  pt denies of any exertional symptoms  pt with risk factor for cad, with chest pain  chest pain pleuritic , CTA negative  ?esophagitis add Protonix , pt claims she had egd few months ago  check esr  awaiting ecg  echo  continue beta blocker  ? cath awaiting echo report

## 2020-09-01 NOTE — PROGRESS NOTE ADULT - PROBLEM SELECTOR PLAN 3
Patient takes 15 units Humalog Tid.  takes Lantus 40 at night. will start the patient on 10 Humalog and 25 Lantus moderate sliding scale. A1c 6.4  Patient takes 15 units Humalog Tid.  takes Lantus 40 at night. will start the patient on 10 Humalog and 25 Lantus c/ w sliding scale A1c 6.4, TSH 0.9, Chol 175, TG 69, HDL 67, LDL 94  Patient takes 15 units Humalog TID, Lantus 40 at night  C/w 10 Humalog and 25 Lantus   C/w sliding scale

## 2020-09-01 NOTE — PROGRESS NOTE ADULT - PROBLEM SELECTOR PLAN 4
CTA done -ve for P.E.  Showed questionable Esophagitis  Saturating well on room air CTA done -ve for P.E.  Showed questionable Esophagitis, had EGD done as out patient on July 2020  Saturating well on room air CTA chest -ve for PE  Showed questionable Esophagitis, had EGD done as out patient on July 2020  Saturating well on room air

## 2020-09-01 NOTE — PROGRESS NOTE ADULT - ASSESSMENT
M E D I C A L   A T T E N D I N G    F O L L O W    U P   N O T E                                     ------------------------------------------------------------------------------------------------    patient evaluated by me, case discussed with team, chart, medications, and physical exam reviewed, labs / tests  and vitals reviewed by me, as bellow.   Patient is stable for discharge today. pt with high cardiac risk with negative cardiac workup, GI findings likely not explaning pain      pt with tenderpoint on posterior and anterior ribs: likely costochondritis / musculoskeletal pain      pt also has had a negative mammogram as stated by pt  Patient to follow up with  GI and PMD  pt encouraged to increase exercise / activity Tyllenol for pain   See discharge document for full note.      ==================>> MEDICATIONS <<====================    acetaminophen   Tablet .. 650 milliGRAM(s) Oral once  acetaZOLAMIDE    Tablet 500 milliGRAM(s) Oral two times a day  aspirin  chewable 81 milliGRAM(s) Oral daily  atorvastatin 40 milliGRAM(s) Oral at bedtime  dextrose 5%. 1000 milliLiter(s) IV Continuous <Continuous>  dextrose 50% Injectable 25 Gram(s) IV Push once  dicyclomine 10 milliGRAM(s) Oral three times a day before meals  dorzolamide 2% Ophthalmic Solution 1 Drop(s) Both EYES three times a day  enoxaparin Injectable 40 milliGRAM(s) SubCutaneous daily  insulin glargine Injectable (LANTUS) 25 Unit(s) SubCutaneous at bedtime  insulin lispro (HumaLOG) corrective regimen sliding scale   SubCutaneous three times a day before meals  insulin lispro (HumaLOG) corrective regimen sliding scale   SubCutaneous at bedtime  insulin lispro Injectable (HumaLOG) 10 Unit(s) SubCutaneous three times a day before meals  latanoprost 0.005% Ophthalmic Solution 1 Drop(s) Both EYES at bedtime  metoprolol tartrate 12.5 milliGRAM(s) Oral two times a day  pantoprazole    Tablet 40 milliGRAM(s) Oral before breakfast    MEDICATIONS  (PRN):  acetaminophen   Tablet .. 650 milliGRAM(s) Oral every 4 hours PRN Mild Pain (1 - 3)  glucagon  Injectable 1 milliGRAM(s) IntraMuscular once PRN Glucose LESS THAN 70 milligrams/deciliter  oxycodone    5 mG/acetaminophen 325 mG 1 Tablet(s) Oral every 4 hours PRN Moderate Pain (4 - 6)    ___________  Active diet:  Diet, Regular:   Consistent Carbohydrate Evening Snacks  DASH/TLC Sodium & Cholesterol Restricted  ___________________    ==================>> VITAL SIGNS <<==================    T(C): 36.9 (09-01-20 @ 14:21), Max: 36.9 (09-01-20 @ 05:13)  HR: 82 (09-01-20 @ 14:21) (65 - 82)  BP: 132/81 (09-01-20 @ 14:21) (122/52 - 138/78)  RR: 18 (09-01-20 @ 14:21) (17 - 18)  SpO2: 97% (09-01-20 @ 14:21) (96% - 98%)     POCT Blood Glucose.: 162 mg/dL (01 Sep 2020 11:36)  POCT Blood Glucose.: 248 mg/dL (01 Sep 2020 07:43)  POCT Blood Glucose.: 286 mg/dL (31 Aug 2020 23:23)  POCT Blood Glucose.: 352 mg/dL (31 Aug 2020 21:27)  POCT Blood Glucose.: 165 mg/dL (31 Aug 2020 17:28)       ==================>> LAB AND IMAGING <<==================                        10.9   8.57  )-----------( 197      ( 01 Sep 2020 07:32 )             36.0        09-01    137  |  105  |  11  ----------------------------<  207<H>  3.5   |  27  |  0.96    Ca    9.7      01 Sep 2020 07:32  Phos  2.6     09-01  Mg     2.3     09-01    TPro  7.2  /  Alb  3.0<L>  /  TBili  0.9  /  DBili  x   /  AST  12  /  ALT  19  /  AlkPhos  80  09-01    PT/INR - ( 31 Aug 2020 11:41 )   PT: 13.6 sec;   INR: 1.17 ratio         PTT - ( 31 Aug 2020 11:41 )  PTT:27.9 sec            CARDIAC MARKERS ( 01 Sep 2020 07:32 )  <0.015 ng/mL / x     / 78 U/L / x     / <1.0 ng/mL  CARDIAC MARKERS ( 31 Aug 2020 18:20 )  <0.015 ng/mL / x     / 110 U/L / x     / <1.0 ng/mL  CARDIAC MARKERS ( 31 Aug 2020 11:41 )  <0.015 ng/mL / x     / x     / x     / x           TSH:      0.95   (09-01-20)           Lipid profile:  (09-01-20)     Total: 175     LDL  : 94     HDL  :67     TG   :69     HgA1C:   (09-01-20)      (pending)    (09-01-20)      6.4  WBC count:   8.57 <<== ,  9.48 <<==   Hemoglobin:   10.9 <<==,  12.0 <<==  platelets:  197 <==, 204 <==    Creatinine:  0.96  <<==, 0.94  <<==  Sodium:   137  <==, 138  <==       AST:          12 <== , 13 <==      ALT:        19  <== , 22  <==      AP:        80  <=, 90  <=     Bili:        0.9  <=, 0.7  <=     < from: Transthoracic Echocardiogram (09.01.20 @ 07:19) >  CONCLUSIONS:  1. Normal mitral valve.  2. Normal trileaflet aortic valve.  3. Aortic Root: 3.2 cm.  4. Normal left atrium.  LA volume index = 33 cc/m2.  5. Normal left ventricular internal dimensions and wall  thicknesses.  6. Normal Left Ventricular Systolic Function,  (EF = 55 to  60%)  7. Grade I diastolic dysfunction (Impaired relaxation).  8. Normal right atrium.  9. Normal right ventricular size and systolic function  (TAPSE 1.9 cm).  10. Unable to estimate RVSP.  11. There is mild tricuspid regurgitation.  12. There is trace pulmonic regurgitation.  13. Normal pericardium with no pericardial effusion.    ------------------------------------------------------------------------  Confirmed on  9/1/2020 - 12:32:42 by Luisa Quiroga MD  ------------------------------------------------------------------------    < end of copied text >  \    < from: Nuclear Stress Test-Pharmacologic (09.01.20 @ 08:56) >  IMPRESSIONS:  * There is a small, partially reversible, mild intensity  defect in the mid anterior wall that thickens, most  consistent with attenuation artifact.  * Post-stress resting myocardial perfusion gated SPECT  imaging was performed (LVEF = 69 %;LVEDV = 100 ml.)  * Negative study for significant reversible ischemia    ------------------------------------------------------------------------      ------------------------------------------------------------------------    Confirmed on  9/1/2020 - 12:41:07 at [place] by  Luis E Padilla MD  < end of copied text >      outpatient EGD report reportedly showing gastro-esophagitis

## 2020-09-01 NOTE — PROGRESS NOTE ADULT - PROBLEM SELECTOR PLAN 2
Patient has Hx of HTN.  takes acetazolamide 500, will continue with home meds  C/w b blocker Patient has Hx of HTN  takes acetazolamide 500, will continue with home meds  C/w b blocker

## 2020-09-01 NOTE — MEDICAL STUDENT ADULT H&P (EDUCATION) - NS MD HP STUD ASPLAN PLAN FT
1. Chest pain w/ breathing  - pleuritis vs. pericarditis vs. costochondritis vs PE vs CAD  - pt is describing chest pain w. breathing- pleuritic pain?  - CTA and CXR showed no pulmonary consolidation, pleural effusions, evidence of PE, or atelectasis. PE unlikely  - ECG performed showed no pericardial effusion. There is mild pulmonic and trace tricuspid regurg. EF is 55-60%. Stress test was neg for significant ischemic changes and cardio recommends f/u as outpatient.   2. SOB  3. HTN  4. DM  5. DVT prophylaxis 1. Chest pain w/ breathing  - pleuritis vs. pericarditis vs. costochondritis vs PE vs CAD  - pt is describing chest pain w. breathing- pleuritic pain?  - CTA and CXR showed no pulmonary consolidation, pleural effusions, evidence of PE, or atelectasis. PE unlikely  - CTA did show esophageal thickening. Pt was seen by a GI doctor recently for EGD. Will obtain records.   - ECG performed showed no pericardial effusion. There is mild pulmonic and trace tricuspid regurg. EF is 55-60%. Stress test was neg for significant ischemic changes and cardio recommends f/u as outpatient.   2. SOB  - pt complains of SOB but says its 2/2 asthma. She recently self-discontinued inhalers prescribed by her doctor for asthma.   - counseled about medication compliance.  3. HTN  - pt's bp was 158/87 upon presenting to the ED. Pt was in pain at the time. Since admission, bp has normalized.   - encourage medication compliance.  4. DM  - pt's HbA1c was 6.4%, which indicates adequate glycemic control.   - encourage 3x daily fingersticks and compliance with insulin.   4. DM  5. DVT prophylaxis

## 2020-09-01 NOTE — DISCHARGE NOTE PROVIDER - NSDCCPCAREPLAN_GEN_ALL_CORE_FT
PRINCIPAL DISCHARGE DIAGNOSIS  Diagnosis: Chest pain  Assessment and Plan of Treatment: Patient admitted to Medicine for atypical chest pain work up pleuritic origin. EKG NSR, troponin x2 negative. echocardiogram showed EF >55% and Grade 1 diastolic dysfunciton Patient was taken for stress test which was negative   CTA negative for PE, esophageal thickening suggestive of esophagitis (outpatient EGD performed 7/16/20 showed mild esophagitis, biopsy taken, mild gastritis, mild duodenitis). Patient will be send home on omeprazole and will need follow up with GI as outpatient      SECONDARY DISCHARGE DIAGNOSES  Diagnosis: HTN (hypertension)  Assessment and Plan of Treatment: Continue with blood pressure medication. Maintain a healthy diet that consist of low sugar, low fat, low sodium diet. Exercise frequently if possible.  Follow up with primary care physician in one week after discharge.      Diagnosis: DM (diabetes mellitus)  Assessment and Plan of Treatment: Continue with your blood sugar medication. HbAIC was noted to be 6.4. You must maintain a healthy diet that consist of low sugar, low fat, low sodium diet. Exercise frequently if possible. Consider repeating your Hemoglobin A1c within 3 months after discharge to monitor your average blood glucose control. Follow up with primary care physician in one week after discharge.

## 2020-09-01 NOTE — MEDICAL STUDENT ADULT H&P (EDUCATION) - NS MD HP STUD PE RESPIRATORY FT
no signs of trauma or infection upon visual inspection. lungs clear to auscultation bilaterally in A/P.

## 2020-09-01 NOTE — MEDICAL STUDENT ADULT H&P (EDUCATION) - NS MD HP STUD RESULTS RAD FT
CT Angio Chest w/ IV Cont: EXAM:  CT ANGIO CHEST (W)AW IC                        PROCEDURE DATE:  08/31/2020    INTERPRETATION:  CLINICAL INFORMATION: Shortness of breath  IMPRESSION:  Negative for pulmonary embolism.  Esophageal wall thickening, question esophagitis.  EXAM:  XR CHEST PORTABLE URGENT 1V                   PROCEDURE DATE:  08/31/2020    INTERPRETATION:  INDICATION: Chest pain  FINDINGS: Heart size appears within normal limits. No hilar or superior mediastinal abnormalities are identified. There is no evidence for focal infiltrate, lobar consolidation or pulmonary edema. No pleural effusion or pneumothorax is demonstrated. No mediastinal shift is noted. The visualized osseous structures appear unremarkable.

## 2020-09-01 NOTE — DISCHARGE NOTE PROVIDER - CARE PROVIDER_API CALL
Nelson Mchugh  CARDIOVASCULAR DISEASE  [address], [address]  [place] [address]  Phone: [phone]  Fax: [phone]  Follow Up Time:

## 2020-09-01 NOTE — MEDICAL STUDENT ADULT H&P (EDUCATION) - NS MD HP STUD MEDICATIONS FT
MEDICATIONS  (STANDING):  acetaZOLAMIDE    Tablet 500 milliGRAM(s) Oral two times a day  aspirin  chewable 81 milliGRAM(s) Oral daily  atorvastatin 40 milliGRAM(s) Oral at bedtime  dextrose 5%. 1000 milliLiter(s) (50 mL/Hr) IV Continuous <Continuous>  dextrose 50% Injectable 25 Gram(s) IV Push once  dicyclomine 10 milliGRAM(s) Oral three times a day before meals  dorzolamide 2% Ophthalmic Solution 1 Drop(s) Both EYES three times a day  enoxaparin Injectable 40 milliGRAM(s) SubCutaneous daily  insulin glargine Injectable (LANTUS) 25 Unit(s) SubCutaneous at bedtime  insulin lispro (HumaLOG) corrective regimen sliding scale   SubCutaneous three times a day before meals  insulin lispro (HumaLOG) corrective regimen sliding scale   SubCutaneous at bedtime  insulin lispro Injectable (HumaLOG) 10 Unit(s) SubCutaneous three times a day before meals  latanoprost 0.005% Ophthalmic Solution 1 Drop(s) Both EYES at bedtime  metoprolol tartrate 12.5 milliGRAM(s) Oral two times a day  pantoprazole    Tablet 40 milliGRAM(s) Oral before breakfast  MEDICATIONS  (PRN):  acetaminophen   Tablet .. 650 milliGRAM(s) Oral every 4 hours PRN Mild Pain (1 - 3)  glucagon  Injectable 1 milliGRAM(s) IntraMuscular once PRN Glucose LESS THAN 70 milligrams/deciliter  oxycodone    5 mG/acetaminophen 325 mG 1 Tablet(s) Oral every 4 hours PRN Moderate Pain (4 - 6)

## 2020-09-01 NOTE — MEDICAL STUDENT ADULT H&P (EDUCATION) - NS MD HP STUD RESULTS LAB FT
WBC Count: 9.48, [3.80 - 10.50 K/uL]  RBC Count: 4.13, [3.80 - 5.20 M/uL]  Hemoglobin: 12.0, [11.5 - 15.5 g/dL]  Hematocrit: 39.3, [34.5 - 45.0 %]  Mean Cell Volume: 95.2, [80.0 - 100.0 fl]  Mean Cell Hemoglobin: 29.1, [27.0 - 34.0 pg]  Mean Cell Hemoglobin Conc:   30.5, L [32.0 - 36.0 gm/dL]  Red Cell Distrib Width: 12.6, [10.3 - 14.5 %]  Platelet Count - Automated: 204, [150 - 400 K/uL]  Nucleated RBC: 0, [0 - 0 /100 WBCs]  Prothrombin Time, Plasma: 13.6, [10.6 - 13.6 sec]  INR:   1.17, [0.88 - 1.16 ratio]    31-Aug-2020 11:41, Comprehensive Metabolic Panel

## 2020-09-01 NOTE — DISCHARGE NOTE PROVIDER - NSDCMRMEDTOKEN_GEN_ALL_CORE_FT
acetaZOLAMIDE 500 mg oral capsule, extended release: 1 cap(s) orally once a day  Combigan 0.2%-0.5% ophthalmic solution: 1 drop(s) to each affected eye every 12 hours  dicyclomine 10 mg oral capsule: 1 cap(s) orally 3 times a day (with meals)  dorzolamide 2% ophthalmic solution: 1 drop(s) to each affected eye 3 times a day  ibuprofen 800 mg oral tablet: 1 tab(s) orally 2 times a day  Janumet 50 mg-500 mg oral tablet: 1 tab(s) orally 2 times a day  latanoprost 0.005% ophthalmic solution: 1 drop(s) to each affected eye once a day (in the evening)  Lipitor 40 mg oral tablet: 1 tab(s) orally once a day  methazolAMIDE 50 mg oral tablet: 1 tab(s) orally 2 times a day  nortriptyline 10 mg oral capsule: 1 tab(s) orally once a day (at bedtime)  omeprazole 40 mg oral delayed release capsule: 1 cap(s) orally once a day  Valium 2 mg oral tablet: 1 tab(s) orally 2 times a day, As Needed for muscle pain MDD:2 acetaZOLAMIDE 500 mg oral capsule, extended release: 1 cap(s) orally once a day  aspirin 81 mg oral tablet, chewable: 1 tab(s) orally once a day  Combigan 0.2%-0.5% ophthalmic solution: 1 drop(s) to each affected eye every 12 hours  dicyclomine 10 mg oral capsule: 1 cap(s) orally 3 times a day (with meals)  dorzolamide 2% ophthalmic solution: 1 drop(s) to each affected eye 3 times a day  ibuprofen 800 mg oral tablet: 1 tab(s) orally 2 times a day  Janumet 50 mg-500 mg oral tablet: 1 tab(s) orally 2 times a day  latanoprost 0.005% ophthalmic solution: 1 drop(s) to each affected eye once a day (in the evening)  Lipitor 40 mg oral tablet: 1 tab(s) orally once a day  methazolAMIDE 50 mg oral tablet: 1 tab(s) orally 2 times a day  nortriptyline 10 mg oral capsule: 1 tab(s) orally once a day (at bedtime)  omeprazole 40 mg oral delayed release capsule: 1 cap(s) orally once a day  Valium 2 mg oral tablet: 1 tab(s) orally 2 times a day, As Needed for muscle pain MDD:2

## 2020-09-01 NOTE — MEDICAL STUDENT ADULT H&P (EDUCATION) - NS MD HP STUD RESULTS EKG FT
Ventricular Rate 89 BPM    Atrial Rate 89 BPM    P-R Interval 162 ms    QRS Duration 78 ms    Q-T Interval 352 ms    QTC Calculation(Bezet) 428 ms    P Axis 60 degrees    R Axis 12 degrees    T Axis 30 degrees    Diagnosis Line Normal sinus rhythm  Normal ECG

## 2020-09-01 NOTE — DISCHARGE NOTE PROVIDER - HOSPITAL COURSE
64 yo female with past medical history of HTN , DM (On insulin) HLD presented to ED c/o left sided chest pain x 2 days worsening with deep breathing. Denies any radiation or previous episodes. Patient denies cough, fever, headache, dizziness, bowel or urinary problems.         Patient admitted to Medicine for atypical chest pain work up pleuritic origin. EKG NSR, troponin x2 negative. CTA negative for PE, esophageal thickening suggestive of esophagitis (outpatient EGD performed 7/16/20 shoed mild esophagitis, biopsy taken, mild gastritis, mild duodenitis). D dimer 330, non significant, 64 yo female with past medical history of HTN , DM (On insulin) HLD presented to ED c/o left sided chest pain x 2 days worsening with deep breathing. Denies any radiation or previous episodes. Patient denies cough, fever, headache, dizziness, bowel or urinary problems.         Patient admitted to Medicine for atypical chest pain work up pleuritic origin. EKG NSR, troponin x2 negative. echocardiogram showed EF >55% and Grade 1 diastolic dysfunciton Patient was taken for stress test which was negative     CTA negative for PE, esophageal thickening suggestive of esophagitis (outpatient EGD performed 7/16/20 shoed mild esophagitis, biopsy taken, mild gastritis, mild duodenitis). Patient will be started on protonix.             Given patient's improved clinical status and current hemodynamic stability, decision was made to discharge.    Please refer to patient's complete medical chart with documents for a full hospital course, for this is only a brief summary.

## 2020-09-01 NOTE — PROGRESS NOTE ADULT - ASSESSMENT
65 female with medical history of HTN , DM ( On insulin) HLD presented to ED after she was having pain in middle of chest for 2 days. Patient went to PCP office this am and was sent to ED for further evaluation. Patient reports pain in centre of chest increases when she takes deep breath and non radiating, alleviated by nothing. Patient denies cough, fever, head ache, dizziness, bowel or urinary problems. 66 y/o female with past medical history of HTN , DM ( On insulin) HLD presented to ED c/o left sided chest pain. Patient admitted to medicine for chest pain work up to r/o ACS

## 2020-09-01 NOTE — PROGRESS NOTE ADULT - SUBJECTIVE AND OBJECTIVE BOX
PGY-1 Progress Note discussed with attending    PAGER #: [[phone]] TILL 5:00 PM  PLEASE CONTACT ON CALL TEAM:  - On Call Team (Please refer to Christoph) FROM 5:00 PM - 8:30PM  - Nightfloat Team FROM 8:30 -7:30 AM    CHIEF COMPLAINT & BRIEF HOSPITAL COURSE:  64 yo female with past medical history of HTN , DM (On insulin) HLD presented to ED c/o left sided chest pain x 2 days worsening with deep breathing. Denies any radiation or previous episodes. Patient denies cough, fever, headache, dizziness, bowel or urinary problems.   Patient admitted to Medicine for atypical chest pain work up pleuritic origin. EKG NSR, troponin x2 -ve, CTA -ve for PE, esophageal thickening suggestive of esophagitis (outpatient EGD performed 7/16/20 shoed mild esophagitis, biopsy taken, mild gastritis, mild duodenitis). D dimer 330, non significant,     INTERVAL HPI/OVERNIGHT EVENTS:       REVIEW OF SYSTEMS:  CONSTITUTIONAL: No fever, weight loss, or fatigue  RESPIRATORY: No cough, wheezing, chills or hemoptysis; No shortness of breath  CARDIOVASCULAR: No chest pain, palpitations, dizziness, or leg swelling  GASTROINTESTINAL: No abdominal pain. No nausea, vomiting, or hematemesis; No diarrhea or constipation. No melena or hematochezia.  GENITOURINARY: No dysuria or hematuria, urinary frequency  NEUROLOGICAL: No headaches, memory loss, loss of strength, numbness, or tremors  SKIN: No itching, burning, rashes, or lesions     MEDICATIONS  (STANDING):  acetaZOLAMIDE    Tablet 500 milliGRAM(s) Oral two times a day  aspirin  chewable 81 milliGRAM(s) Oral daily  atorvastatin 40 milliGRAM(s) Oral at bedtime  dextrose 5%. 1000 milliLiter(s) (50 mL/Hr) IV Continuous <Continuous>  dextrose 50% Injectable 25 Gram(s) IV Push once  dicyclomine 10 milliGRAM(s) Oral three times a day before meals  dorzolamide 2% Ophthalmic Solution 1 Drop(s) Both EYES three times a day  enoxaparin Injectable 40 milliGRAM(s) SubCutaneous daily  insulin glargine Injectable (LANTUS) 25 Unit(s) SubCutaneous at bedtime  insulin lispro (HumaLOG) corrective regimen sliding scale   SubCutaneous three times a day before meals  insulin lispro (HumaLOG) corrective regimen sliding scale   SubCutaneous at bedtime  insulin lispro Injectable (HumaLOG) 10 Unit(s) SubCutaneous three times a day before meals  latanoprost 0.005% Ophthalmic Solution 1 Drop(s) Both EYES at bedtime  metoprolol tartrate 12.5 milliGRAM(s) Oral two times a day  pantoprazole    Tablet 40 milliGRAM(s) Oral before breakfast    MEDICATIONS  (PRN):  acetaminophen   Tablet .. 650 milliGRAM(s) Oral every 4 hours PRN Mild Pain (1 - 3)  glucagon  Injectable 1 milliGRAM(s) IntraMuscular once PRN Glucose LESS THAN 70 milligrams/deciliter  oxycodone    5 mG/acetaminophen 325 mG 1 Tablet(s) Oral every 4 hours PRN Moderate Pain (4 - 6)      Vital Signs Last 24 Hrs  T(C): 36.7 (01 Sep 2020 10:47), Max: 36.9 (01 Sep 2020 05:13)  T(F): 98.1 (01 Sep 2020 10:47), Max: 98.4 (01 Sep 2020 05:13)  HR: 73 (01 Sep 2020 10:47) (65 - 85)  BP: 129/78 (01 Sep 2020 10:47) (122/52 - 141/79)  BP(mean): --  RR: 18 (01 Sep 2020 10:47) (16 - 18)  SpO2: 97% (01 Sep 2020 10:47) (96% - 99%)    PHYSICAL EXAMINATION:  GENERAL: NAD, well built  HEAD:  Atraumatic, Normocephalic  EYES:  conjunctiva and sclera clear  NECK: Supple, No JVD, Normal thyroid  CHEST/LUNG: Clear to auscultation. Clear to percussion bilaterally; No rales, rhonchi, wheezing, or rubs  HEART: Regular rate and rhythm; No murmurs, rubs, or gallops  ABDOMEN: Soft, Nontender, Nondistended; Bowel sounds present  NERVOUS SYSTEM:  Alert & Oriented X3,    EXTREMITIES:  2+ Peripheral Pulses, No clubbing, cyanosis, or edema  SKIN: warm dry                          10.9   8.57  )-----------( 197      ( 01 Sep 2020 07:32 )             36.0     09-01    137  |  105  |  11  ----------------------------<  207<H>  3.5   |  27  |  0.96    Ca    9.7      01 Sep 2020 07:32  Phos  2.6     09-01  Mg     2.3     09-01    TPro  7.2  /  Alb  3.0<L>  /  TBili  0.9  /  DBili  x   /  AST  12  /  ALT  19  /  AlkPhos  80  09-01    LIVER FUNCTIONS - ( 01 Sep 2020 07:32 )  Alb: 3.0 g/dL / Pro: 7.2 g/dL / ALK PHOS: 80 U/L / ALT: 19 U/L DA / AST: 12 U/L / GGT: x           CARDIAC MARKERS ( 01 Sep 2020 07:32 )  <0.015 ng/mL / x     / 78 U/L / x     / <1.0 ng/mL  CARDIAC MARKERS ( 31 Aug 2020 18:20 )  <0.015 ng/mL / x     / 110 U/L / x     / <1.0 ng/mL  CARDIAC MARKERS ( 31 Aug 2020 11:41 )  <0.015 ng/mL / x     / x     / x     / x          PT/INR - ( 31 Aug 2020 11:41 )   PT: 13.6 sec;   INR: 1.17 ratio         PTT - ( 31 Aug 2020 11:41 )  PTT:27.9 sec    I&O's Summary          CAPILLARY BLOOD GLUCOSE      RADIOLOGY & ADDITIONAL TESTS: PGY-1 Progress Note discussed with attending    PAGER #: [[phone]] TILL 5:00 PM  PLEASE CONTACT ON CALL TEAM:  - On Call Team (Please refer to Christoph) FROM 5:00 PM - 8:30PM  - Nightfloat Team FROM 8:30 -7:30 AM    CHIEF COMPLAINT & BRIEF HOSPITAL COURSE:  64 yo female with past medical history of HTN , DM (On insulin) HLD presented to ED c/o left sided chest pain x 2 days worsening with deep breathing. Denies any radiation or previous episodes. Patient denies cough, fever, headache, dizziness, bowel or urinary problems.   Patient admitted to Medicine for atypical chest pain work up pleuritic origin. EKG NSR, troponin x2 -ve, CTA -ve for PE, esophageal thickening suggestive of esophagitis (outpatient EGD performed 7/16/20 showed mild esophagitis, biopsy taken, mild gastritis, mild duodenitis. D dimer 330, non significant,     INTERVAL HPI/OVERNIGHT EVENTS: patient refers left sided chest pain improved compared to admission. Also refers dysphagia however patient is following w/ GI as out patient. EGD was done in July 2020, showed mild esophagitis, biopsy taken, mild gastritis, mild duodenitis, waiting for biopsy results. Denies any other symptoms.       MEDICATIONS  (STANDING):  acetaZOLAMIDE    Tablet 500 milliGRAM(s) Oral two times a day  aspirin  chewable 81 milliGRAM(s) Oral daily  atorvastatin 40 milliGRAM(s) Oral at bedtime  dextrose 5%. 1000 milliLiter(s) (50 mL/Hr) IV Continuous <Continuous>  dextrose 50% Injectable 25 Gram(s) IV Push once  dicyclomine 10 milliGRAM(s) Oral three times a day before meals  dorzolamide 2% Ophthalmic Solution 1 Drop(s) Both EYES three times a day  enoxaparin Injectable 40 milliGRAM(s) SubCutaneous daily  insulin glargine Injectable (LANTUS) 25 Unit(s) SubCutaneous at bedtime  insulin lispro (HumaLOG) corrective regimen sliding scale   SubCutaneous three times a day before meals  insulin lispro (HumaLOG) corrective regimen sliding scale   SubCutaneous at bedtime  insulin lispro Injectable (HumaLOG) 10 Unit(s) SubCutaneous three times a day before meals  latanoprost 0.005% Ophthalmic Solution 1 Drop(s) Both EYES at bedtime  metoprolol tartrate 12.5 milliGRAM(s) Oral two times a day  pantoprazole    Tablet 40 milliGRAM(s) Oral before breakfast    MEDICATIONS  (PRN):  acetaminophen   Tablet .. 650 milliGRAM(s) Oral every 4 hours PRN Mild Pain (1 - 3)  glucagon  Injectable 1 milliGRAM(s) IntraMuscular once PRN Glucose LESS THAN 70 milligrams/deciliter  oxycodone    5 mG/acetaminophen 325 mG 1 Tablet(s) Oral every 4 hours PRN Moderate Pain (4 - 6)      Vital Signs Last 24 Hrs  T(C): 36.7 (01 Sep 2020 10:47), Max: 36.9 (01 Sep 2020 05:13)  T(F): 98.1 (01 Sep 2020 10:47), Max: 98.4 (01 Sep 2020 05:13)  HR: 73 (01 Sep 2020 10:47) (65 - 85)  BP: 129/78 (01 Sep 2020 10:47) (122/52 - 141/79)  BP(mean): --  RR: 18 (01 Sep 2020 10:47) (16 - 18)  SpO2: 97% (01 Sep 2020 10:47) (96% - 99%)    PHYSICAL EXAMINATION:  GENERAL: NAD, overweight   HEAD:  Atraumatic, Normocephalic  EYES:  conjunctiva and sclera clear  NECK: Supple, No JVD, Normal thyroid  CHEST/LUNG: Clear to auscultation. Clear to percussion bilaterally; No rales, rhonchi, wheezing, or rubs, pain on left side of chest, non tender, no lesions   HEART: Regular rate and rhythm; No murmurs, rubs, or gallops  ABDOMEN: Soft, Nontender, Nondistended; Bowel sounds present, no pain on palpation   NERVOUS SYSTEM:  Alert & Oriented X3  : voiding well     EXTREMITIES:  2+ Peripheral Pulses, No clubbing, cyanosis, or edema  SKIN: warm dry intact                           10.9   8.57  )-----------( 197      ( 01 Sep 2020 07:32 )             36.0     09-01    137  |  105  |  11  ----------------------------<  207<H>  3.5   |  27  |  0.96    Ca    9.7      01 Sep 2020 07:32  Phos  2.6     09-01  Mg     2.3     09-01    TPro  7.2  /  Alb  3.0<L>  /  TBili  0.9  /  DBili  x   /  AST  12  /  ALT  19  /  AlkPhos  80  09-01    LIVER FUNCTIONS - ( 01 Sep 2020 07:32 )  Alb: 3.0 g/dL / Pro: 7.2 g/dL / ALK PHOS: 80 U/L / ALT: 19 U/L DA / AST: 12 U/L / GGT: x           CARDIAC MARKERS ( 01 Sep 2020 07:32 )  <0.015 ng/mL / x     / 78 U/L / x     / <1.0 ng/mL  CARDIAC MARKERS ( 31 Aug 2020 18:20 )  <0.015 ng/mL / x     / 110 U/L / x     / <1.0 ng/mL  CARDIAC MARKERS ( 31 Aug 2020 11:41 )  <0.015 ng/mL / x     / x     / x     / x          PT/INR - ( 31 Aug 2020 11:41 )   PT: 13.6 sec;   INR: 1.17 ratio         PTT - ( 31 Aug 2020 11:41 )  PTT:27.9 sec    I&O's Summary      CAPILLARY BLOOD GLUCOSE      RADIOLOGY & ADDITIONAL TESTS: PGY-1 Progress Note discussed with attending    PAGER #: [[phone]] TILL 5:00 PM  PLEASE CONTACT ON CALL TEAM:  - On Call Team (Please refer to Christoph) FROM 5:00 PM - 8:30PM  - Nightfloat Team FROM 8:30 -7:30 AM    CHIEF COMPLAINT & BRIEF HOSPITAL COURSE:  64 yo female with past medical history of HTN, DM (On insulin) HLD presented to ED c/o left sided chest pain x 2 days worsening with deep breathing. Denies any radiation or previous episodes. Patient denies cough, fever, headache, dizziness, bowel or urinary problems.   Patient admitted to Medicine for atypical chest pain work up. EKG NSR, trop x 2 -ve, CXR -ve, CTA -ve for PE, esophageal thickening suggestive of esophagitis (outpatient EGD performed 7/16/20 showed mild esophagitis, biopsy taken, mild gastritis, mild duodenitis), D dimer 330, non significant, stress test -ve, TTE GIDD, EF >55%, mild TR/ME.    INTERVAL HPI/OVERNIGHT EVENTS: patient refers left sided chest pain improved compared to admission. Also refers dysphagia however patient is following w/ GI as out patient. EGD was done in July 2020, showed mild esophagitis, biopsy taken, mild gastritis, mild duodenitis, waiting for biopsy results. Denies any other symptoms.       MEDICATIONS  (STANDING):  acetaZOLAMIDE    Tablet 500 milliGRAM(s) Oral two times a day  aspirin  chewable 81 milliGRAM(s) Oral daily  atorvastatin 40 milliGRAM(s) Oral at bedtime  dextrose 5%. 1000 milliLiter(s) (50 mL/Hr) IV Continuous <Continuous>  dextrose 50% Injectable 25 Gram(s) IV Push once  dicyclomine 10 milliGRAM(s) Oral three times a day before meals  dorzolamide 2% Ophthalmic Solution 1 Drop(s) Both EYES three times a day  enoxaparin Injectable 40 milliGRAM(s) SubCutaneous daily  insulin glargine Injectable (LANTUS) 25 Unit(s) SubCutaneous at bedtime  insulin lispro (HumaLOG) corrective regimen sliding scale   SubCutaneous three times a day before meals  insulin lispro (HumaLOG) corrective regimen sliding scale   SubCutaneous at bedtime  insulin lispro Injectable (HumaLOG) 10 Unit(s) SubCutaneous three times a day before meals  latanoprost 0.005% Ophthalmic Solution 1 Drop(s) Both EYES at bedtime  metoprolol tartrate 12.5 milliGRAM(s) Oral two times a day  pantoprazole    Tablet 40 milliGRAM(s) Oral before breakfast    MEDICATIONS  (PRN):  acetaminophen   Tablet .. 650 milliGRAM(s) Oral every 4 hours PRN Mild Pain (1 - 3)  glucagon  Injectable 1 milliGRAM(s) IntraMuscular once PRN Glucose LESS THAN 70 milligrams/deciliter  oxycodone    5 mG/acetaminophen 325 mG 1 Tablet(s) Oral every 4 hours PRN Moderate Pain (4 - 6)      Vital Signs Last 24 Hrs  T(C): 36.7 (01 Sep 2020 10:47), Max: 36.9 (01 Sep 2020 05:13)  T(F): 98.1 (01 Sep 2020 10:47), Max: 98.4 (01 Sep 2020 05:13)  HR: 73 (01 Sep 2020 10:47) (65 - 85)  BP: 129/78 (01 Sep 2020 10:47) (122/52 - 141/79)  BP(mean): --  RR: 18 (01 Sep 2020 10:47) (16 - 18)  SpO2: 97% (01 Sep 2020 10:47) (96% - 99%)    PHYSICAL EXAMINATION:  GENERAL: NAD, overweight   HEAD:  Atraumatic, Normocephalic  EYES:  conjunctiva and sclera clear  NECK: Supple, No JVD, Normal thyroid  CHEST/LUNG: Clear to auscultation. Clear to percussion bilaterally; No rales, rhonchi, wheezing, or rubs, pain on left side of chest, non tender, no lesions   HEART: Regular rate and rhythm; No murmurs, rubs, or gallops  ABDOMEN: Soft, Nontender, Nondistended; Bowel sounds present, no pain on palpation   NERVOUS SYSTEM:  Alert & Oriented X3  : voiding well     EXTREMITIES:  2+ Peripheral Pulses, No clubbing, cyanosis, or edema  SKIN: warm dry intact                           10.9   8.57  )-----------( 197      ( 01 Sep 2020 07:32 )             36.0     09-01    137  |  105  |  11  ----------------------------<  207<H>  3.5   |  27  |  0.96    Ca    9.7      01 Sep 2020 07:32  Phos  2.6     09-01  Mg     2.3     09-01    TPro  7.2  /  Alb  3.0<L>  /  TBili  0.9  /  DBili  x   /  AST  12  /  ALT  19  /  AlkPhos  80  09-01    LIVER FUNCTIONS - ( 01 Sep 2020 07:32 )  Alb: 3.0 g/dL / Pro: 7.2 g/dL / ALK PHOS: 80 U/L / ALT: 19 U/L DA / AST: 12 U/L / GGT: x           CARDIAC MARKERS ( 01 Sep 2020 07:32 )  <0.015 ng/mL / x     / 78 U/L / x     / <1.0 ng/mL  CARDIAC MARKERS ( 31 Aug 2020 18:20 )  <0.015 ng/mL / x     / 110 U/L / x     / <1.0 ng/mL  CARDIAC MARKERS ( 31 Aug 2020 11:41 )  <0.015 ng/mL / x     / x     / x     / x          PT/INR - ( 31 Aug 2020 11:41 )   PT: 13.6 sec;   INR: 1.17 ratio         PTT - ( 31 Aug 2020 11:41 )  PTT:27.9 sec    I&O's Summary      CAPILLARY BLOOD GLUCOSE      RADIOLOGY & ADDITIONAL TESTS:

## 2021-03-25 NOTE — MEDICAL STUDENT ADULT H&P (EDUCATION) - NS MD HP STUD PE CARDIO FT
Outgoing Calls:  3/25/2021    CHW did call [employer] to inquire about status of pt's waiver application  CHW was assisted by [employer] at [address] [place] [address]  [employer] informed CHW that pt was medically approved today and that the [facility] office will receive the referral by tomorrow for review to determine if pt is financially eligible  CHW did call pt and informed her of this update  CHW informed pt that she may either receive a phone call or letter from [facility] requesting additional information  CHW asked pt to please keep her updated so that she can continue to assist pt in this process  Pt agreed  Next outreach is scheduled for 4/1/2021  s1 s2 auscultated- no murmurs, rubs, or gallops

## 2021-07-20 ENCOUNTER — EMERGENCY (EMERGENCY)
Facility: HOSPITAL | Age: 66
LOS: 1 days | Discharge: HOME | End: 2021-07-20
Attending: STUDENT IN AN ORGANIZED HEALTH CARE EDUCATION/TRAINING PROGRAM
Payer: COMMERCIAL

## 2021-07-20 VITALS
WEIGHT: 246.04 LBS | DIASTOLIC BLOOD PRESSURE: 70 MMHG | SYSTOLIC BLOOD PRESSURE: 180 MMHG | RESPIRATION RATE: 16 BRPM | HEART RATE: 80 BPM | OXYGEN SATURATION: 98 % | HEIGHT: 63 IN | TEMPERATURE: 98 F

## 2021-07-20 DIAGNOSIS — Z98.891 HISTORY OF UTERINE SCAR FROM PREVIOUS SURGERY: Chronic | ICD-10-CM

## 2021-07-20 LAB
BASOPHILS # BLD AUTO: 0.01 K/UL — SIGNIFICANT CHANGE UP (ref 0–0.2)
BASOPHILS NFR BLD AUTO: 0.1 % — SIGNIFICANT CHANGE UP (ref 0–2)
EOSINOPHIL # BLD AUTO: 0.08 K/UL — SIGNIFICANT CHANGE UP (ref 0–0.5)
EOSINOPHIL NFR BLD AUTO: 0.9 % — SIGNIFICANT CHANGE UP (ref 0–6)
HCT VFR BLD CALC: 38.4 % — SIGNIFICANT CHANGE UP (ref 34.5–45)
HGB BLD-MCNC: 11.8 G/DL — SIGNIFICANT CHANGE UP (ref 11.5–15.5)
IMM GRANULOCYTES NFR BLD AUTO: 0.2 % — SIGNIFICANT CHANGE UP (ref 0–1.5)
LYMPHOCYTES # BLD AUTO: 1.83 K/UL — SIGNIFICANT CHANGE UP (ref 1–3.3)
LYMPHOCYTES # BLD AUTO: 21 % — SIGNIFICANT CHANGE UP (ref 13–44)
MCHC RBC-ENTMCNC: 29.4 PG — SIGNIFICANT CHANGE UP (ref 27–34)
MCHC RBC-ENTMCNC: 30.7 GM/DL — LOW (ref 32–36)
MCV RBC AUTO: 95.8 FL — SIGNIFICANT CHANGE UP (ref 80–100)
MONOCYTES # BLD AUTO: 0.55 K/UL — SIGNIFICANT CHANGE UP (ref 0–0.9)
MONOCYTES NFR BLD AUTO: 6.3 % — SIGNIFICANT CHANGE UP (ref 2–14)
NEUTROPHILS # BLD AUTO: 6.23 K/UL — SIGNIFICANT CHANGE UP (ref 1.8–7.4)
NEUTROPHILS NFR BLD AUTO: 71.5 % — SIGNIFICANT CHANGE UP (ref 43–77)
NRBC # BLD: 0 /100 WBCS — SIGNIFICANT CHANGE UP (ref 0–0)
PLATELET # BLD AUTO: 204 K/UL — SIGNIFICANT CHANGE UP (ref 150–400)
RBC # BLD: 4.01 M/UL — SIGNIFICANT CHANGE UP (ref 3.8–5.2)
RBC # FLD: 13 % — SIGNIFICANT CHANGE UP (ref 10.3–14.5)
WBC # BLD: 8.72 K/UL — SIGNIFICANT CHANGE UP (ref 3.8–10.5)
WBC # FLD AUTO: 8.72 K/UL — SIGNIFICANT CHANGE UP (ref 3.8–10.5)

## 2021-07-20 PROCEDURE — 99285 EMERGENCY DEPT VISIT HI MDM: CPT

## 2021-07-20 RX ORDER — SODIUM CHLORIDE 9 MG/ML
1000 INJECTION INTRAMUSCULAR; INTRAVENOUS; SUBCUTANEOUS ONCE
Refills: 0 | Status: COMPLETED | OUTPATIENT
Start: 2021-07-20 | End: 2021-07-20

## 2021-07-20 RX ORDER — KETOROLAC TROMETHAMINE 30 MG/ML
30 SYRINGE (ML) INJECTION ONCE
Refills: 0 | Status: DISCONTINUED | OUTPATIENT
Start: 2021-07-20 | End: 2021-07-20

## 2021-07-20 NOTE — ED PROVIDER NOTE - OBJECTIVE STATEMENT
The patient is a 66y Female with hx of stroke x2, T2DM, HTN, glaucoma complaining of LLQ abdominal pain for 3 weeks. Pain is sharp, constant & localized, does not radiate, and is made worse by movement and bending at the waist. The pain started as a 3/10, over the past two days it has become 8/10. Pt also had vomiting for the first time today after eating food. Denies nausea currently. Pt denies trauma or other inciting event. The patient is a 66y Female with hx of stroke x2, T2DM, HTN, glaucoma complaining of LLQ abdominal pain for 3 weeks. Pain is sharp, constant & localized, does not radiate, and is made worse by movement and bending at the waist. The pain started as a 3/10, over the past two days it has become 8/10. Pt also had vomiting for the first time today after eating food. Denies nausea currently. Pt denies trauma or other inciting event. Pt denies changes to eating/drinking, has no urinary symptoms, no chest pain, SOB, or other symptoms. The patient is a 66y Female with hx of stroke x2, T2DM, HTN, glaucoma complaining of LLQ abdominal pain for 3 weeks. Pain is sharp, constant & localized, does not radiate, and is made worse by movement and bending at the waist. The pain started as a 3/10, over the past two days it has become 8/10. Pt also had vomiting for the first time today after eating food. Denies nausea currently. Pt denies trauma, inciting events, changes to eating/drinking, urinary symptoms, constipation, diarrhea, chest pain, SOB, fever or other symptoms.

## 2021-07-20 NOTE — ED PROVIDER NOTE - PMH
DM (diabetes mellitus)    DM (diabetes mellitus)    Essential hypertension    HTN (hypertension)    Stroke  Pt did not know if hemorrhagic or ischemic, could not remember exact date.  Uncomplicated asthma, unspecified asthma severity     DM (diabetes mellitus)    DM (diabetes mellitus)    Essential hypertension    HTN (hypertension)    Stroke    Uncomplicated asthma, unspecified asthma severity

## 2021-07-20 NOTE — ED PROVIDER NOTE - CARE PLAN
Patient called with concerns with blood on his bandage. Garland, states there is about a 2 inch diameter ring on his bandage. Patient explained the spot has not gotten any bigger since yesterday evening, but wants Dr Cabello to know. Writer explained some bleeding is normal and to keep an eye on it if it gets any bigger. Patient still wants to hear from Prudence CHAKRABORTY to send them pictures.    Principal Discharge DX:	Fibroid  Secondary Diagnosis:	Abdominal pain

## 2021-07-20 NOTE — ED PROVIDER NOTE - PROGRESS NOTE DETAILS
patient updated on results. would like to stay for US in the morning to evaluate for torsion. Marcial Rivas Munoz: sono shows fibroid. pt can be dc with gyn follow up

## 2021-07-20 NOTE — ED PROVIDER NOTE - CLINICAL SUMMARY MEDICAL DECISION MAKING FREE TEXT BOX
66 year old woman presenting with sharp LLQ pain tender to palpation. Attending Rob: 66F presenting with LLQ pain for several weeks, worse over past 2 days with vomiting. LLQ/left pelvic tenderness to palpation. normal bowel movements. concern for colitis, uti, torsion. labs, CT abdomen. will reassess.

## 2021-07-20 NOTE — ED PROVIDER NOTE - ATTENDING CONTRIBUTION TO CARE
I was physically present for the E/M service provided. I agree with above history, physical, and plan which I have reviewed and edited where appropriate. I was physically present for the key portions of the service provided.   66F with LLQ abdominal pain. well appearing, normal work of breathing, LLQ/left suprapubic tenderness to palpation. labs and CT to assess for colitis, uti.

## 2021-07-20 NOTE — ED PROVIDER NOTE - PATIENT PORTAL LINK FT
You can access the FollowMyHealth Patient Portal offered by [facility] by registering at the following website: [email or web address]. By joining [facility]’s FollowMyHealth portal, you will also be able to view your health information using other applications (apps) compatible with our system.

## 2021-07-21 VITALS
HEART RATE: 62 BPM | SYSTOLIC BLOOD PRESSURE: 122 MMHG | OXYGEN SATURATION: 100 % | RESPIRATION RATE: 17 BRPM | DIASTOLIC BLOOD PRESSURE: 82 MMHG | TEMPERATURE: 99 F

## 2021-07-21 PROBLEM — I10 ESSENTIAL (PRIMARY) HYPERTENSION: Chronic | Status: ACTIVE | Noted: 2020-08-31

## 2021-07-21 PROBLEM — E11.9 TYPE 2 DIABETES MELLITUS WITHOUT COMPLICATIONS: Chronic | Status: ACTIVE | Noted: 2020-08-31

## 2021-07-21 LAB
ALBUMIN SERPL ELPH-MCNC: 3.3 G/DL — LOW (ref 3.5–5)
ALP SERPL-CCNC: 93 U/L — SIGNIFICANT CHANGE UP (ref 40–120)
ALT FLD-CCNC: 15 U/L DA — SIGNIFICANT CHANGE UP (ref 10–60)
ANION GAP SERPL CALC-SCNC: 10 MMOL/L — SIGNIFICANT CHANGE UP (ref 5–17)
APPEARANCE UR: CLEAR — SIGNIFICANT CHANGE UP
AST SERPL-CCNC: 14 U/L — SIGNIFICANT CHANGE UP (ref 10–40)
BACTERIA # UR AUTO: ABNORMAL /HPF
BILIRUB SERPL-MCNC: 0.3 MG/DL — SIGNIFICANT CHANGE UP (ref 0.2–1.2)
BILIRUB UR-MCNC: NEGATIVE — SIGNIFICANT CHANGE UP
BUN SERPL-MCNC: 19 MG/DL — HIGH (ref 7–18)
CALCIUM SERPL-MCNC: 9.6 MG/DL — SIGNIFICANT CHANGE UP (ref 8.4–10.5)
CHLORIDE SERPL-SCNC: 107 MMOL/L — SIGNIFICANT CHANGE UP (ref 96–108)
CO2 SERPL-SCNC: 24 MMOL/L — SIGNIFICANT CHANGE UP (ref 22–31)
COLOR SPEC: YELLOW — SIGNIFICANT CHANGE UP
CREAT SERPL-MCNC: 1.02 MG/DL — SIGNIFICANT CHANGE UP (ref 0.5–1.3)
DIFF PNL FLD: ABNORMAL
EPI CELLS # UR: SIGNIFICANT CHANGE UP /HPF
GLUCOSE SERPL-MCNC: 247 MG/DL — HIGH (ref 70–99)
GLUCOSE UR QL: 250
HYALINE CASTS # UR AUTO: ABNORMAL /LPF
KETONES UR-MCNC: NEGATIVE — SIGNIFICANT CHANGE UP
LEUKOCYTE ESTERASE UR-ACNC: NEGATIVE — SIGNIFICANT CHANGE UP
MAGNESIUM SERPL-MCNC: 2.1 MG/DL — SIGNIFICANT CHANGE UP (ref 1.6–2.6)
NITRITE UR-MCNC: NEGATIVE — SIGNIFICANT CHANGE UP
PH UR: 6 — SIGNIFICANT CHANGE UP (ref 5–8)
POTASSIUM SERPL-MCNC: 4.3 MMOL/L — SIGNIFICANT CHANGE UP (ref 3.5–5.3)
POTASSIUM SERPL-SCNC: 4.3 MMOL/L — SIGNIFICANT CHANGE UP (ref 3.5–5.3)
PROT SERPL-MCNC: 7.3 G/DL — SIGNIFICANT CHANGE UP (ref 6–8.3)
PROT UR-MCNC: 15
RBC CASTS # UR COMP ASSIST: ABNORMAL /HPF (ref 0–2)
SODIUM SERPL-SCNC: 141 MMOL/L — SIGNIFICANT CHANGE UP (ref 135–145)
SP GR SPEC: 1.02 — SIGNIFICANT CHANGE UP (ref 1.01–1.02)
UROBILINOGEN FLD QL: NEGATIVE — SIGNIFICANT CHANGE UP
WBC UR QL: SIGNIFICANT CHANGE UP /HPF (ref 0–5)

## 2021-07-21 PROCEDURE — 83735 ASSAY OF MAGNESIUM: CPT

## 2021-07-21 PROCEDURE — 76856 US EXAM PELVIC COMPLETE: CPT

## 2021-07-21 PROCEDURE — 76856 US EXAM PELVIC COMPLETE: CPT | Mod: 26

## 2021-07-21 PROCEDURE — 80053 COMPREHEN METABOLIC PANEL: CPT

## 2021-07-21 PROCEDURE — 74177 CT ABD & PELVIS W/CONTRAST: CPT | Mod: MA

## 2021-07-21 PROCEDURE — 85025 COMPLETE CBC W/AUTO DIFF WBC: CPT

## 2021-07-21 PROCEDURE — 74177 CT ABD & PELVIS W/CONTRAST: CPT | Mod: 26,MA

## 2021-07-21 PROCEDURE — 76830 TRANSVAGINAL US NON-OB: CPT | Mod: 26

## 2021-07-21 PROCEDURE — 99284 EMERGENCY DEPT VISIT MOD MDM: CPT | Mod: 25

## 2021-07-21 PROCEDURE — 87086 URINE CULTURE/COLONY COUNT: CPT

## 2021-07-21 PROCEDURE — 76830 TRANSVAGINAL US NON-OB: CPT

## 2021-07-21 PROCEDURE — 36415 COLL VENOUS BLD VENIPUNCTURE: CPT

## 2021-07-21 PROCEDURE — 81001 URINALYSIS AUTO W/SCOPE: CPT

## 2021-07-21 PROCEDURE — 96374 THER/PROPH/DIAG INJ IV PUSH: CPT | Mod: XU

## 2021-07-21 RX ADMIN — Medication 30 MILLIGRAM(S): at 00:13

## 2021-07-21 RX ADMIN — SODIUM CHLORIDE 1000 MILLILITER(S): 9 INJECTION INTRAMUSCULAR; INTRAVENOUS; SUBCUTANEOUS at 00:13

## 2021-07-21 NOTE — ED ADULT NURSE NOTE - NSIMPLEMENTINTERV_GEN_ALL_ED
Implemented All Universal Safety Interventions:  [place] to call system. Call bell, personal items and telephone within reach. Instruct patient to call for assistance. Room bathroom lighting operational. Non-slip footwear when patient is off stretcher. Physically safe environment: no spills, clutter or unnecessary equipment. Stretcher in lowest position, wheels locked, appropriate side rails in place.

## 2021-07-22 LAB
CULTURE RESULTS: SIGNIFICANT CHANGE UP
SPECIMEN SOURCE: SIGNIFICANT CHANGE UP

## 2022-01-07 NOTE — CONSULT NOTE ADULT - CONSULT REQUESTED DATE/TIME
Nephrology Follow up Consultation  Luis Monzon 52 y o  male MRN: [id number]            BACKGROUND:  Luis Monzon is a 52 y o male who was referred by Jermaine Bazan DO for evaluation of Follow-up, Hypertension, and Hyponatremia    ASSESSMENT / PLAN:   52 y o   male with pmh of multiple co-morbidities including hypertension (x6yrs) presents to the office for routine follow-up  Renal :  - After review of records in [facility] as well as Care everywhere patient has a baseline creatinine of   1-1 2 mg/dL dating as far back as 2012  Most recent labs show a Creatinine of  0 92 mg/dL on 1/5/22  Renal function remains stable  Check blood work in 1 month  -  At risk for CKD secondary to age-related nephron loss plus hypertensive nephrosclerosis  - seen by vascular  Most recent Dopplers showing no evidence of renal artery stenosis  - Acid base and lytes stable except for hyponatremia    - Clinically the patient appears to be  euvolemic  - Recommend to avoid use of NSAIDs, nephrotoxins  Caution advised with regards to exposure to IV contrast dye    - Discussed with the patient in depth   His renal status, including the possible etiologies for CKD  - Advised the patient that when  his GFR is close to 20mL/min then will start discussing about RRT(renal replacement therapy) options such as renal transplant, peritoneal dialysis and hemodialysis  - Informed the patient about the various options for Renal Replacement therapy  - Discussed with the patient how we need to work together to delay the progression of CKD with optimal BP control based on their age and co-morbidities and trying to reduce proteinuria by the use of anti-proteinuric agents        resistant Hypertension:  - Patient is on  Lisinopril 40 mg p o  q day, lasix 20mg po Q24,procardia XL 30mg po Q24  - increase Lasix to 40 mg p o  q day and Procardia XL to 60 mg p o  q h s  check blood pressures routinely and call with updates in 2 weeks get BMP in 1 month  - underwent workup for secondary hypertension all negative to date  - renin and aldosterone and level within normal limits metanephrines normal PTH normal   -  Advised patient to cut down on his caffeine intake as well as try other methods for stress reduction  - Goal BP of <  140/90 based on age and comorbidities  - Instructed to follow low sodium (2gm)diet   - Advised to hold ACEI/ARBs if patient suffers from dehydration due to gastrointestinal losses due to risk of ROBERT secondary to failure to autoregulate  Hemoglobin:  - Goal Hb of 10-12 g/dL    CKD-MBD(Mineral Bone Disease): - Based on patients CKD stage following is the goal of therapy  - Maintain calcium phosphorus product of < 55   - Continue patient on   No vitamin-D  -  most recent vitamin-D level 36 7 and intact PTH of 31 5 as of June 2021    Proteinuria:  - most recent UA from 2017 with no blood or protein  - check protein creatinine ratio and UA  - currently on therapy for proteinuria with  lisinopril    Lipids:  - goal LDL less than 70  - Management as per PCP     hyponatremia:  -  Most recent serum sodium from jan 2022 at 133 mEq  -  cont fluid restrict to 1 5 L per day     Nutrition:  - Encouraged patient to follow adiet comprising of moderate potassium, low phosphorus, low salt intake, decrease caffeine intake and protein restriction to 0 8gm/kg  - Will check serum albumin with next blood work  Followup:  - Patient is to follow-up in 6 months, with lab work to be performed 1 month and then again in a few days prior to the next visit  Advised patient to call me in 10 days to review the results if they do not hear back from me, as I may have not received the results  John Almonte MD, [facility], 1/7/2022, 1:59 PM             SUBJECTIVE: 52 y o  male presents to the office for routine follow-up    Feels well currently has no complaints other than reports he was sick about 2-3 weeks ago and since then his blood pressures have been 31-Aug-2020 elevated at home also  No issues with edema still continues to drink close to 2-3 L a day of water  No NSAID use no recent hospitalization thankful the care information that he has gotten today due to adjusting his blood pressure medications at this time and getting repeat blood work in a month  No major changes in diet  Review of Systems   Constitutional: Negative for appetite change, chills, fatigue and fever  HENT: Negative for congestion, rhinorrhea and sore throat  Respiratory: Negative for cough, shortness of breath and wheezing  Cardiovascular: Negative for leg swelling  Gastrointestinal: Negative for abdominal pain, constipation, diarrhea, nausea and vomiting  Genitourinary: Negative for difficulty urinating, dysuria and hematuria  Musculoskeletal: Negative for back pain  Skin: Negative for rash and wound  Neurological: Negative for dizziness, light-headedness and headaches  Psychiatric/Behavioral: Negative for agitation and confusion  All other systems reviewed and are negative  PAST MEDICAL HISTORY:  Past Medical History:   Diagnosis Date    Hypertension        PROBLEM LIST    Patient Active Problem List   Diagnosis    Essential hypertension    Resistant hypertension    Celiac artery stenosis (HCC)    Dyslipidemia    Hyponatremia       PAST SURGICAL HISTORY:  History reviewed  No pertinent surgical history  SOCIAL HISTORY :   reports that he has never smoked  He has never used smokeless tobacco  He reports current alcohol use of about 15 0 standard drinks of alcohol per week  He reports that he does not use drugs      FAMILY HISTORY:  Family History   Problem Relation Age of Onset    Hypertension Mother          2020 from colon burst    Aneurysm Father         of Abdominal Aorta    Hypertension Father          1993 to tear in aeorta    Crohn's disease Brother         Colitis    Ulcerative colitis Brother     No Known Problems Sister    Debi Alvarez Coronary artery disease Maternal Grandmother        ALLERGIES:  No Known Allergies        PHYSICAL EXAM:  Vitals:    01/07/22 1340 01/07/22 1350   BP: 164/83 168/84   BP Location: Left arm    Patient Position: Sitting    Cuff Size: Standard    Pulse: 98    Weight: 83 kg (183 lb)    Height: 6' (1 829 m)      Body mass index is 24 82 kg/m²  Physical Exam  Vitals reviewed  Constitutional:       General: He is not in acute distress  Appearance: Normal appearance  He is normal weight  He is not ill-appearing, toxic-appearing or diaphoretic  HENT:      Head: Normocephalic and atraumatic  Mouth/Throat:      Mouth: Mucous membranes are moist       Pharynx: Oropharynx is clear  No oropharyngeal exudate  Eyes:      General: No scleral icterus  Conjunctiva/sclera: Conjunctivae normal    Cardiovascular:      Rate and Rhythm: Normal rate  Heart sounds: Normal heart sounds  No friction rub  Pulmonary:      Effort: Pulmonary effort is normal  No respiratory distress  Breath sounds: Normal breath sounds  No stridor  No wheezing  Abdominal:      General: There is no distension  Palpations: Abdomen is soft  There is no mass  Tenderness: There is no abdominal tenderness  There is no right CVA tenderness or left CVA tenderness  Musculoskeletal:         General: No swelling  Cervical back: Normal range of motion and neck supple  Skin:     General: Skin is warm  Coloration: Skin is not jaundiced  Neurological:      General: No focal deficit present  Mental Status: He is alert and oriented to person, place, and time     Psychiatric:         Mood and Affect: Mood normal          Behavior: Behavior normal          LABORATORY DATA:     Results from last 6 Months   Lab Units 01/05/22  0709 11/03/21  0713 08/27/21  1429   POTASSIUM mmol/L 4 5 3 7 3 8   CHLORIDE mmol/L 101 99* 101   CO2 mmol/L 27 29 27   BUN mg/dL 12 12 17   CREATININE mg/dL 0 92 0 94 1 02   CALCIUM mg/dL 9 4 9 7 9  4   PHOSPHORUS mg/dL 3 3  --   --         rest all reviewed    RADIOLOGY:  No orders to display     Rest all reviewed        MEDICATIONS:    Current Outpatient Medications:     atorvastatin (LIPITOR) 20 mg tablet, Take 0 5 tablets (10 mg total) by mouth daily, Disp: 30 tablet, Rfl: 5    furosemide (LASIX) 40 mg tablet, Take 1 tablet (40 mg total) by mouth daily, Disp: 90 tablet, Rfl: 3    lisinopril (ZESTRIL) 40 mg tablet, TAKE 1 TABLET BY MOUTH EVERY DAY, Disp: 90 tablet, Rfl: 1    NIFEdipine (PROCARDIA XL) 60 mg 24 hr tablet, Take 1 tablet (60 mg total) by mouth daily at bedtime, Disp: 90 tablet, Rfl: 3          Portions of the record may have been created with voice recognition software  Occasional wrong word or "sound a like" substitutions may have occurred due to the inherent limitations of voice recognition software  Read the chart carefully and recognize, using context, where substitutions have occurred  If you have any questions, please contact the dictating provider

## 2022-09-08 NOTE — PATIENT PROFILE ADULT - NSPROGENSOURCEINFO_GEN_A_NUR
patient [Follow-Up Evaluation] : a follow-up evaluation for [Other: ____] : [unfilled] [Mother] : mother [Medical Records] : medical records [Other: _____] : [unfilled]

## 2022-10-25 NOTE — ED ADULT NURSE NOTE - CAS TRG GENERAL NORM CIRC DET
Encourage fluids, Tylenol/Ibuprofen, OTC decongestants   Dex IM   Antibiotic sent to pharmacy.   If symptoms worsen or fail to improve follow-up with office or PCP  If SOB, chest pain, or high persistent fevers occur, go to ER    Patient verbalized understanding and agrees to plan Strong peripheral pulses

## 2023-05-08 ENCOUNTER — EMERGENCY (EMERGENCY)
Facility: HOSPITAL | Age: 68
LOS: 1 days | Discharge: HOME | End: 2023-05-08
Attending: STUDENT IN AN ORGANIZED HEALTH CARE EDUCATION/TRAINING PROGRAM
Payer: COMMERCIAL

## 2023-05-08 VITALS
TEMPERATURE: 99 F | WEIGHT: 262.35 LBS | HEART RATE: 82 BPM | SYSTOLIC BLOOD PRESSURE: 152 MMHG | HEIGHT: 63 IN | DIASTOLIC BLOOD PRESSURE: 83 MMHG | RESPIRATION RATE: 17 BRPM

## 2023-05-08 VITALS
SYSTOLIC BLOOD PRESSURE: 132 MMHG | HEART RATE: 76 BPM | DIASTOLIC BLOOD PRESSURE: 72 MMHG | OXYGEN SATURATION: 100 % | TEMPERATURE: 98 F | RESPIRATION RATE: 18 BRPM

## 2023-05-08 DIAGNOSIS — Z98.891 HISTORY OF UTERINE SCAR FROM PREVIOUS SURGERY: Chronic | ICD-10-CM

## 2023-05-08 PROBLEM — I63.9 CEREBRAL INFARCTION, UNSPECIFIED: Chronic | Status: ACTIVE | Noted: 2021-07-20

## 2023-05-08 LAB
ALBUMIN SERPL ELPH-MCNC: 3.5 G/DL — SIGNIFICANT CHANGE UP (ref 3.5–5)
ALP SERPL-CCNC: 75 U/L — SIGNIFICANT CHANGE UP (ref 40–120)
ALT FLD-CCNC: 30 U/L DA — SIGNIFICANT CHANGE UP (ref 10–60)
ANION GAP SERPL CALC-SCNC: -1 MMOL/L — LOW (ref 5–17)
APPEARANCE UR: CLEAR — SIGNIFICANT CHANGE UP
AST SERPL-CCNC: 30 U/L — SIGNIFICANT CHANGE UP (ref 10–40)
BASOPHILS # BLD AUTO: 0.03 K/UL — SIGNIFICANT CHANGE UP (ref 0–0.2)
BASOPHILS NFR BLD AUTO: 0.4 % — SIGNIFICANT CHANGE UP (ref 0–2)
BILIRUB SERPL-MCNC: 0.4 MG/DL — SIGNIFICANT CHANGE UP (ref 0.2–1.2)
BILIRUB UR-MCNC: NEGATIVE — SIGNIFICANT CHANGE UP
BUN SERPL-MCNC: 9 MG/DL — SIGNIFICANT CHANGE UP (ref 7–18)
CALCIUM SERPL-MCNC: 9.8 MG/DL — SIGNIFICANT CHANGE UP (ref 8.4–10.5)
CHLORIDE SERPL-SCNC: 102 MMOL/L — SIGNIFICANT CHANGE UP (ref 96–108)
CO2 SERPL-SCNC: 31 MMOL/L — SIGNIFICANT CHANGE UP (ref 22–31)
COLOR SPEC: YELLOW — SIGNIFICANT CHANGE UP
CREAT SERPL-MCNC: 0.91 MG/DL — SIGNIFICANT CHANGE UP (ref 0.5–1.3)
DIFF PNL FLD: NEGATIVE — SIGNIFICANT CHANGE UP
EGFR: 69 ML/MIN/1.73M2 — SIGNIFICANT CHANGE UP
EOSINOPHIL # BLD AUTO: 0.15 K/UL — SIGNIFICANT CHANGE UP (ref 0–0.5)
EOSINOPHIL NFR BLD AUTO: 2.2 % — SIGNIFICANT CHANGE UP (ref 0–6)
GLUCOSE SERPL-MCNC: 180 MG/DL — HIGH (ref 70–99)
GLUCOSE UR QL: NEGATIVE — SIGNIFICANT CHANGE UP
HCT VFR BLD CALC: 37.6 % — SIGNIFICANT CHANGE UP (ref 34.5–45)
HGB BLD-MCNC: 11.7 G/DL — SIGNIFICANT CHANGE UP (ref 11.5–15.5)
IMM GRANULOCYTES NFR BLD AUTO: 0.1 % — SIGNIFICANT CHANGE UP (ref 0–0.9)
KETONES UR-MCNC: NEGATIVE — SIGNIFICANT CHANGE UP
LEUKOCYTE ESTERASE UR-ACNC: NEGATIVE — SIGNIFICANT CHANGE UP
LIDOCAIN IGE QN: 1873 U/L — HIGH (ref 73–393)
LYMPHOCYTES # BLD AUTO: 2.58 K/UL — SIGNIFICANT CHANGE UP (ref 1–3.3)
LYMPHOCYTES # BLD AUTO: 37.7 % — SIGNIFICANT CHANGE UP (ref 13–44)
MCHC RBC-ENTMCNC: 29.3 PG — SIGNIFICANT CHANGE UP (ref 27–34)
MCHC RBC-ENTMCNC: 31.1 GM/DL — LOW (ref 32–36)
MCV RBC AUTO: 94.2 FL — SIGNIFICANT CHANGE UP (ref 80–100)
MONOCYTES # BLD AUTO: 0.53 K/UL — SIGNIFICANT CHANGE UP (ref 0–0.9)
MONOCYTES NFR BLD AUTO: 7.7 % — SIGNIFICANT CHANGE UP (ref 2–14)
NEUTROPHILS # BLD AUTO: 3.54 K/UL — SIGNIFICANT CHANGE UP (ref 1.8–7.4)
NEUTROPHILS NFR BLD AUTO: 51.9 % — SIGNIFICANT CHANGE UP (ref 43–77)
NITRITE UR-MCNC: NEGATIVE — SIGNIFICANT CHANGE UP
NRBC # BLD: 0 /100 WBCS — SIGNIFICANT CHANGE UP (ref 0–0)
PH UR: 6 — SIGNIFICANT CHANGE UP (ref 5–8)
PLATELET # BLD AUTO: 221 K/UL — SIGNIFICANT CHANGE UP (ref 150–400)
POTASSIUM SERPL-MCNC: 4.7 MMOL/L — SIGNIFICANT CHANGE UP (ref 3.5–5.3)
POTASSIUM SERPL-SCNC: 4.7 MMOL/L — SIGNIFICANT CHANGE UP (ref 3.5–5.3)
PROT SERPL-MCNC: 7.1 G/DL — SIGNIFICANT CHANGE UP (ref 6–8.3)
PROT UR-MCNC: NEGATIVE — SIGNIFICANT CHANGE UP
RBC # BLD: 3.99 M/UL — SIGNIFICANT CHANGE UP (ref 3.8–5.2)
RBC # FLD: 12.6 % — SIGNIFICANT CHANGE UP (ref 10.3–14.5)
SODIUM SERPL-SCNC: 132 MMOL/L — LOW (ref 135–145)
SP GR SPEC: 1.01 — SIGNIFICANT CHANGE UP (ref 1.01–1.02)
UROBILINOGEN FLD QL: NEGATIVE — SIGNIFICANT CHANGE UP
WBC # BLD: 6.84 K/UL — SIGNIFICANT CHANGE UP (ref 3.8–10.5)
WBC # FLD AUTO: 6.84 K/UL — SIGNIFICANT CHANGE UP (ref 3.8–10.5)

## 2023-05-08 PROCEDURE — 99284 EMERGENCY DEPT VISIT MOD MDM: CPT

## 2023-05-08 PROCEDURE — 96361 HYDRATE IV INFUSION ADD-ON: CPT

## 2023-05-08 PROCEDURE — 87086 URINE CULTURE/COLONY COUNT: CPT

## 2023-05-08 PROCEDURE — 85025 COMPLETE CBC W/AUTO DIFF WBC: CPT

## 2023-05-08 PROCEDURE — 83690 ASSAY OF LIPASE: CPT

## 2023-05-08 PROCEDURE — 81003 URINALYSIS AUTO W/O SCOPE: CPT

## 2023-05-08 PROCEDURE — 96374 THER/PROPH/DIAG INJ IV PUSH: CPT | Mod: XU

## 2023-05-08 PROCEDURE — 74177 CT ABD & PELVIS W/CONTRAST: CPT | Mod: 26,MA

## 2023-05-08 PROCEDURE — 84478 ASSAY OF TRIGLYCERIDES: CPT

## 2023-05-08 PROCEDURE — 36415 COLL VENOUS BLD VENIPUNCTURE: CPT

## 2023-05-08 PROCEDURE — 99284 EMERGENCY DEPT VISIT MOD MDM: CPT | Mod: 25

## 2023-05-08 PROCEDURE — 74177 CT ABD & PELVIS W/CONTRAST: CPT | Mod: MA

## 2023-05-08 PROCEDURE — 80053 COMPREHEN METABOLIC PANEL: CPT

## 2023-05-08 RX ORDER — KETOROLAC TROMETHAMINE 30 MG/ML
30 SYRINGE (ML) INJECTION ONCE
Refills: 0 | Status: DISCONTINUED | OUTPATIENT
Start: 2023-05-08 | End: 2023-05-08

## 2023-05-08 RX ORDER — SODIUM CHLORIDE 9 MG/ML
1000 INJECTION INTRAMUSCULAR; INTRAVENOUS; SUBCUTANEOUS ONCE
Refills: 0 | Status: COMPLETED | OUTPATIENT
Start: 2023-05-08 | End: 2023-05-08

## 2023-05-08 RX ADMIN — SODIUM CHLORIDE 1000 MILLILITER(S): 9 INJECTION INTRAMUSCULAR; INTRAVENOUS; SUBCUTANEOUS at 16:54

## 2023-05-08 RX ADMIN — Medication 30 MILLIGRAM(S): at 17:55

## 2023-05-08 RX ADMIN — SODIUM CHLORIDE 1000 MILLILITER(S): 9 INJECTION INTRAMUSCULAR; INTRAVENOUS; SUBCUTANEOUS at 17:55

## 2023-05-08 RX ADMIN — Medication 30 MILLIGRAM(S): at 16:55

## 2023-05-08 NOTE — ED ADULT NURSE NOTE - NSICDXPASTMEDICALHX_GEN_ALL_CORE_FT
PAST MEDICAL HISTORY:  DM (diabetes mellitus)     DM (diabetes mellitus)     Essential hypertension     HTN (hypertension)     Stroke     Uncomplicated asthma, unspecified asthma severity

## 2023-05-08 NOTE — ED PROVIDER NOTE - ATTENDING APP SHARED VISIT CONTRIBUTION OF CARE
Patient presenting with bl lower abd pain  Abdomen soft, non-tender, no guarding.  will obtain lab, ct, pain control and reassess

## 2023-05-08 NOTE — ED PROVIDER NOTE - OBJECTIVE STATEMENT
68 year old female with PMHx of stroke, diabetes mellitus, hypertension, high cholesterol is presenting for 2 weeks of bilateral lower abdominal pain. Pt reports that the pain is worse for the couple days. Pain radiates to the leg. Pt also reports dysuria but no nausea, vomiting, fevers, diarrhea, or constipation. Tylenol with no relief.

## 2023-05-08 NOTE — ED PROVIDER NOTE - PROGRESS NOTE DETAILS
lipase elevated. CT normal. Patient denies upper abd pain. Shared decision making performed with patient regarding pain control with admission vs discharge. patient reports she would prefer to go home and follow up with her primary care doctor. Will dc home with pcp follow up. Pt is well appearing walking with steady gait, stable for discharge and follow up without fail with medical doctor. I had a detailed discussion with the patient and/or guardian regarding the historical points, exam findings, and any diagnostic results supporting the discharge diagnosis. Pt educated on care and need for follow up. Strict return instructions and red flag signs and symptoms discussed with patient. Questions answered. Pt shows understanding of discharge information and agrees to follow.

## 2023-05-08 NOTE — ED PROVIDER NOTE - NSFOLLOWUPINSTRUCTIONS_ED_ALL_ED_FT
Follow up with your primary care doctor within 1 week.     Clear liquid diet for 48 hours then slowly advance your diet to more solid foods.     You can take motrin/tylenol as needed for pain.    If you experience any new or worsening symptoms or if you are concerned you can always come back to the emergency for a re-evaluation.

## 2023-05-08 NOTE — ED PROVIDER NOTE - CLINICAL SUMMARY MEDICAL DECISION MAKING FREE TEXT BOX
68 year old female with bilateral lower quadrant abdominal pain. Will get labs, urine, CT abd/pelvis.

## 2023-05-08 NOTE — ED PROVIDER NOTE - PATIENT PORTAL LINK FT
You can access the FollowMyHealth Patient Portal offered by [facility] by registering at the following website: [email or web address]. By joining [employer]’s FollowMyHealth portal, you will also be able to view your health information using other applications (apps) compatible with our system.

## 2023-05-09 LAB
CULTURE RESULTS: SIGNIFICANT CHANGE UP
SPECIMEN SOURCE: SIGNIFICANT CHANGE UP

## 2023-06-28 NOTE — ED ADULT NURSE NOTE - NS ED NURSE PATIENT LEFT UNIT TIME
17:02 Acitretin Counseling:  I discussed with the patient the risks of acitretin including but not limited to hair loss, dry lips/skin/eyes, liver damage, hyperlipidemia, depression/suicidal ideation, photosensitivity.  Serious rare side effects can include but are not limited to pancreatitis, pseudotumor cerebri, bony changes, clot formation/stroke/heart attack.  Patient understands that alcohol is contraindicated since it can result in liver toxicity and significantly prolong the elimination of the drug by many years.

## 2023-07-08 ENCOUNTER — EMERGENCY (EMERGENCY)
Facility: HOSPITAL | Age: 68
LOS: 1 days | Discharge: HOME | End: 2023-07-08
Attending: STUDENT IN AN ORGANIZED HEALTH CARE EDUCATION/TRAINING PROGRAM
Payer: COMMERCIAL

## 2023-07-08 VITALS
HEART RATE: 67 BPM | TEMPERATURE: 98 F | HEIGHT: 63 IN | SYSTOLIC BLOOD PRESSURE: 169 MMHG | DIASTOLIC BLOOD PRESSURE: 89 MMHG | OXYGEN SATURATION: 100 % | RESPIRATION RATE: 18 BRPM | WEIGHT: 262.35 LBS

## 2023-07-08 DIAGNOSIS — Z98.891 HISTORY OF UTERINE SCAR FROM PREVIOUS SURGERY: Chronic | ICD-10-CM

## 2023-07-08 LAB
ALBUMIN SERPL ELPH-MCNC: 3.8 G/DL — SIGNIFICANT CHANGE UP (ref 3.5–5)
ALP SERPL-CCNC: 85 U/L — SIGNIFICANT CHANGE UP (ref 40–120)
ALT FLD-CCNC: 32 U/L DA — SIGNIFICANT CHANGE UP (ref 10–60)
ANION GAP SERPL CALC-SCNC: 7 MMOL/L — SIGNIFICANT CHANGE UP (ref 5–17)
AST SERPL-CCNC: 21 U/L — SIGNIFICANT CHANGE UP (ref 10–40)
BASOPHILS # BLD AUTO: 0.04 K/UL — SIGNIFICANT CHANGE UP (ref 0–0.2)
BASOPHILS NFR BLD AUTO: 0.5 % — SIGNIFICANT CHANGE UP (ref 0–2)
BILIRUB SERPL-MCNC: 0.3 MG/DL — SIGNIFICANT CHANGE UP (ref 0.2–1.2)
BUN SERPL-MCNC: 13 MG/DL — SIGNIFICANT CHANGE UP (ref 7–18)
CALCIUM SERPL-MCNC: 10 MG/DL — SIGNIFICANT CHANGE UP (ref 8.4–10.5)
CHLORIDE SERPL-SCNC: 108 MMOL/L — SIGNIFICANT CHANGE UP (ref 96–108)
CO2 SERPL-SCNC: 26 MMOL/L — SIGNIFICANT CHANGE UP (ref 22–31)
CREAT SERPL-MCNC: 0.88 MG/DL — SIGNIFICANT CHANGE UP (ref 0.5–1.3)
EGFR: 72 ML/MIN/1.73M2 — SIGNIFICANT CHANGE UP
EOSINOPHIL # BLD AUTO: 0.2 K/UL — SIGNIFICANT CHANGE UP (ref 0–0.5)
EOSINOPHIL NFR BLD AUTO: 2.7 % — SIGNIFICANT CHANGE UP (ref 0–6)
GLUCOSE SERPL-MCNC: 71 MG/DL — SIGNIFICANT CHANGE UP (ref 70–99)
HCT VFR BLD CALC: 39 % — SIGNIFICANT CHANGE UP (ref 34.5–45)
HGB BLD-MCNC: 11.7 G/DL — SIGNIFICANT CHANGE UP (ref 11.5–15.5)
IMM GRANULOCYTES NFR BLD AUTO: 0.3 % — SIGNIFICANT CHANGE UP (ref 0–0.9)
LYMPHOCYTES # BLD AUTO: 2.62 K/UL — SIGNIFICANT CHANGE UP (ref 1–3.3)
LYMPHOCYTES # BLD AUTO: 34.8 % — SIGNIFICANT CHANGE UP (ref 13–44)
MAGNESIUM SERPL-MCNC: 2 MG/DL — SIGNIFICANT CHANGE UP (ref 1.6–2.6)
MCHC RBC-ENTMCNC: 29 PG — SIGNIFICANT CHANGE UP (ref 27–34)
MCHC RBC-ENTMCNC: 30 GM/DL — LOW (ref 32–36)
MCV RBC AUTO: 96.8 FL — SIGNIFICANT CHANGE UP (ref 80–100)
MONOCYTES # BLD AUTO: 0.61 K/UL — SIGNIFICANT CHANGE UP (ref 0–0.9)
MONOCYTES NFR BLD AUTO: 8.1 % — SIGNIFICANT CHANGE UP (ref 2–14)
NEUTROPHILS # BLD AUTO: 4.04 K/UL — SIGNIFICANT CHANGE UP (ref 1.8–7.4)
NEUTROPHILS NFR BLD AUTO: 53.6 % — SIGNIFICANT CHANGE UP (ref 43–77)
NRBC # BLD: 0 /100 WBCS — SIGNIFICANT CHANGE UP (ref 0–0)
NT-PROBNP SERPL-SCNC: 151 PG/ML — HIGH (ref 0–125)
PLATELET # BLD AUTO: 250 K/UL — SIGNIFICANT CHANGE UP (ref 150–400)
POTASSIUM SERPL-MCNC: 3.8 MMOL/L — SIGNIFICANT CHANGE UP (ref 3.5–5.3)
POTASSIUM SERPL-SCNC: 3.8 MMOL/L — SIGNIFICANT CHANGE UP (ref 3.5–5.3)
PROT SERPL-MCNC: 7.4 G/DL — SIGNIFICANT CHANGE UP (ref 6–8.3)
RBC # BLD: 4.03 M/UL — SIGNIFICANT CHANGE UP (ref 3.8–5.2)
RBC # FLD: 13.5 % — SIGNIFICANT CHANGE UP (ref 10.3–14.5)
SODIUM SERPL-SCNC: 141 MMOL/L — SIGNIFICANT CHANGE UP (ref 135–145)
TROPONIN I, HIGH SENSITIVITY RESULT: 6.6 NG/L — SIGNIFICANT CHANGE UP
WBC # BLD: 7.53 K/UL — SIGNIFICANT CHANGE UP (ref 3.8–10.5)
WBC # FLD AUTO: 7.53 K/UL — SIGNIFICANT CHANGE UP (ref 3.8–10.5)

## 2023-07-08 PROCEDURE — 83880 ASSAY OF NATRIURETIC PEPTIDE: CPT

## 2023-07-08 PROCEDURE — 85025 COMPLETE CBC W/AUTO DIFF WBC: CPT

## 2023-07-08 PROCEDURE — 99283 EMERGENCY DEPT VISIT LOW MDM: CPT | Mod: 25

## 2023-07-08 PROCEDURE — 84484 ASSAY OF TROPONIN QUANT: CPT

## 2023-07-08 PROCEDURE — 36415 COLL VENOUS BLD VENIPUNCTURE: CPT

## 2023-07-08 PROCEDURE — 80053 COMPREHEN METABOLIC PANEL: CPT

## 2023-07-08 PROCEDURE — 83735 ASSAY OF MAGNESIUM: CPT

## 2023-07-08 PROCEDURE — 99285 EMERGENCY DEPT VISIT HI MDM: CPT

## 2023-07-08 PROCEDURE — 93005 ELECTROCARDIOGRAM TRACING: CPT

## 2023-07-08 NOTE — ED PROVIDER NOTE - OBJECTIVE STATEMENT
68F, pmh of  stroke, diabetes mellitus, hypertension, high cholesterol, Presenting with worsening leg swelling.  Patient reports both legs but primarily her right leg has had increased swelling and is now leaking fluid.  Denies any redness or pain.  No chest pain or trouble breathing.  Reports legs are not typically swollen to this level.

## 2023-07-08 NOTE — ED PROVIDER NOTE - CLINICAL SUMMARY MEDICAL DECISION MAKING FREE TEXT BOX
68-year-old female presenting with bilateral leg swelling.  Breathing comfortably on room air.  Concern for CHF exacerbation versus ANDRE.  Low concern for cellulitis as no erythema or warmth on exam.  Will get labs to assess. 68-year-old female presenting with bilateral leg swelling.  Breathing comfortably on room air.  Concern for CHF exacerbation versus ANDRE.  Low concern for cellulitis as no erythema or warmth on exam.  Will get labs to assess.    no emergent lab findings. has PCP followup next week. stable for outpatient followup.

## 2023-07-08 NOTE — ED ADULT NURSE NOTE - NSFALLUNIVINTERV_ED_ALL_ED
Bed/Stretcher in lowest position, wheels locked, appropriate side rails in place/Call bell, personal items and telephone in reach/Instruct patient to call for assistance before getting out of bed/chair/stretcher/Non-slip footwear applied when patient is off stretcher/[place] to call system/Physically safe environment - no spills, clutter or unnecessary equipment/Purposeful proactive rounding/Room/bathroom lighting operational, light cord in reach

## 2023-07-08 NOTE — ED PROVIDER NOTE - PATIENT PORTAL LINK FT
Was the patient seen in the last year in this department? Yes     Does patient have an active prescription for medications requested? No     Received Request Via: Patient      
You can access the FollowMyHealth Patient Portal offered by [facility] by registering at the following website: [email or web address]. By joining [employer]’s FollowMyHealth portal, you will also be able to view your health information using other applications (apps) compatible with our system.

## 2023-07-08 NOTE — ED PROVIDER NOTE - PHYSICAL EXAMINATION
General: well appearing female, no acute distress   HEENT: normocephalic, atraumatic   Respiratory: normal work of breathing, lungs clear to auscultation bilaterally   MSK: bilateral 3+ pitting edema   Skin: warm, dry   Neuro: A&Ox3  Psych: appropriate affect

## 2023-07-08 NOTE — ED PROVIDER NOTE - NSFOLLOWUPINSTRUCTIONS_ED_ALL_ED_FT
You were seen in the emergency department for leg swelling.    Please follow-up with your primary care doctor within the next 48 hours.    Please keep your legs elevated when at rest.    If you have any worsening symptoms, severe chest pain, trouble breathing, worsening leg swelling or redness, please return to the emergency department.

## 2023-07-08 NOTE — ED ADULT NURSE NOTE - OBJECTIVE STATEMENT
Patient present to the ED A&OX3 c/o of B/L lower leg edema starting two weeks ago. Right leg Patient present to the ED A&OX3 c/o of B/L lower leg edema starting two weeks ago. Right lower lateral leg, water drainage noted. No acute distress noted.

## 2024-07-26 ENCOUNTER — EMERGENCY (EMERGENCY)
Facility: HOSPITAL | Age: 69
LOS: 1 days | Discharge: HOME | End: 2024-07-26
Attending: EMERGENCY MEDICINE
Payer: COMMERCIAL

## 2024-07-26 VITALS
WEIGHT: 229.28 LBS | DIASTOLIC BLOOD PRESSURE: 70 MMHG | TEMPERATURE: 98 F | OXYGEN SATURATION: 98 % | RESPIRATION RATE: 18 BRPM | SYSTOLIC BLOOD PRESSURE: 167 MMHG | HEART RATE: 89 BPM

## 2024-07-26 VITALS
DIASTOLIC BLOOD PRESSURE: 76 MMHG | OXYGEN SATURATION: 97 % | TEMPERATURE: 98 F | SYSTOLIC BLOOD PRESSURE: 140 MMHG | HEART RATE: 77 BPM | RESPIRATION RATE: 18 BRPM

## 2024-07-26 DIAGNOSIS — Z98.891 HISTORY OF UTERINE SCAR FROM PREVIOUS SURGERY: Chronic | ICD-10-CM

## 2024-07-26 LAB
ALBUMIN SERPL ELPH-MCNC: 4 G/DL — SIGNIFICANT CHANGE UP (ref 3.5–5)
ALP SERPL-CCNC: 112 U/L — SIGNIFICANT CHANGE UP (ref 40–120)
ALT FLD-CCNC: 25 U/L DA — SIGNIFICANT CHANGE UP (ref 10–60)
ANION GAP SERPL CALC-SCNC: 6 MMOL/L — SIGNIFICANT CHANGE UP (ref 5–17)
APPEARANCE UR: CLEAR — SIGNIFICANT CHANGE UP
AST SERPL-CCNC: 35 U/L — SIGNIFICANT CHANGE UP (ref 10–40)
BASOPHILS # BLD AUTO: 0.04 K/UL — SIGNIFICANT CHANGE UP (ref 0–0.2)
BASOPHILS NFR BLD AUTO: 0.5 % — SIGNIFICANT CHANGE UP (ref 0–2)
BILIRUB SERPL-MCNC: 0.4 MG/DL — SIGNIFICANT CHANGE UP (ref 0.2–1.2)
BILIRUB UR-MCNC: NEGATIVE — SIGNIFICANT CHANGE UP
BUN SERPL-MCNC: 15 MG/DL — SIGNIFICANT CHANGE UP (ref 7–18)
CALCIUM SERPL-MCNC: 10.2 MG/DL — SIGNIFICANT CHANGE UP (ref 8.4–10.5)
CHLORIDE SERPL-SCNC: 108 MMOL/L — SIGNIFICANT CHANGE UP (ref 96–108)
CO2 SERPL-SCNC: 23 MMOL/L — SIGNIFICANT CHANGE UP (ref 22–31)
COLOR SPEC: YELLOW — SIGNIFICANT CHANGE UP
CREAT SERPL-MCNC: 1.15 MG/DL — SIGNIFICANT CHANGE UP (ref 0.5–1.3)
DIFF PNL FLD: NEGATIVE — SIGNIFICANT CHANGE UP
EGFR: 52 ML/MIN/1.73M2 — LOW
EOSINOPHIL # BLD AUTO: 0.19 K/UL — SIGNIFICANT CHANGE UP (ref 0–0.5)
EOSINOPHIL NFR BLD AUTO: 2.4 % — SIGNIFICANT CHANGE UP (ref 0–6)
GLUCOSE SERPL-MCNC: 255 MG/DL — HIGH (ref 70–99)
GLUCOSE UR QL: 500 MG/DL
HCT VFR BLD CALC: 38.8 % — SIGNIFICANT CHANGE UP (ref 34.5–45)
HGB BLD-MCNC: 12.3 G/DL — SIGNIFICANT CHANGE UP (ref 11.5–15.5)
IMM GRANULOCYTES NFR BLD AUTO: 0.3 % — SIGNIFICANT CHANGE UP (ref 0–0.9)
KETONES UR-MCNC: ABNORMAL MG/DL
LEUKOCYTE ESTERASE UR-ACNC: ABNORMAL
LIDOCAIN IGE QN: 62 U/L — SIGNIFICANT CHANGE UP (ref 13–75)
LYMPHOCYTES # BLD AUTO: 2.5 K/UL — SIGNIFICANT CHANGE UP (ref 1–3.3)
LYMPHOCYTES # BLD AUTO: 31.6 % — SIGNIFICANT CHANGE UP (ref 13–44)
MAGNESIUM SERPL-MCNC: 2 MG/DL — SIGNIFICANT CHANGE UP (ref 1.6–2.6)
MCHC RBC-ENTMCNC: 29.4 PG — SIGNIFICANT CHANGE UP (ref 27–34)
MCHC RBC-ENTMCNC: 31.7 GM/DL — LOW (ref 32–36)
MCV RBC AUTO: 92.8 FL — SIGNIFICANT CHANGE UP (ref 80–100)
MONOCYTES # BLD AUTO: 0.57 K/UL — SIGNIFICANT CHANGE UP (ref 0–0.9)
MONOCYTES NFR BLD AUTO: 7.2 % — SIGNIFICANT CHANGE UP (ref 2–14)
NEUTROPHILS # BLD AUTO: 4.58 K/UL — SIGNIFICANT CHANGE UP (ref 1.8–7.4)
NEUTROPHILS NFR BLD AUTO: 58 % — SIGNIFICANT CHANGE UP (ref 43–77)
NITRITE UR-MCNC: POSITIVE
NRBC # BLD: 0 /100 WBCS — SIGNIFICANT CHANGE UP (ref 0–0)
PH UR: 5.5 — SIGNIFICANT CHANGE UP (ref 5–8)
PLATELET # BLD AUTO: 254 K/UL — SIGNIFICANT CHANGE UP (ref 150–400)
POTASSIUM SERPL-MCNC: 4.1 MMOL/L — SIGNIFICANT CHANGE UP (ref 3.5–5.3)
POTASSIUM SERPL-SCNC: 4.1 MMOL/L — SIGNIFICANT CHANGE UP (ref 3.5–5.3)
PROT SERPL-MCNC: 8.1 G/DL — SIGNIFICANT CHANGE UP (ref 6–8.3)
PROT UR-MCNC: 30 MG/DL
RBC # BLD: 4.18 M/UL — SIGNIFICANT CHANGE UP (ref 3.8–5.2)
RBC # FLD: 13.3 % — SIGNIFICANT CHANGE UP (ref 10.3–14.5)
SODIUM SERPL-SCNC: 137 MMOL/L — SIGNIFICANT CHANGE UP (ref 135–145)
SP GR SPEC: 1.04 — HIGH (ref 1–1.03)
UROBILINOGEN FLD QL: 1 MG/DL — SIGNIFICANT CHANGE UP (ref 0.2–1)
WBC # BLD: 7.9 K/UL — SIGNIFICANT CHANGE UP (ref 3.8–10.5)
WBC # FLD AUTO: 7.9 K/UL — SIGNIFICANT CHANGE UP (ref 3.8–10.5)

## 2024-07-26 PROCEDURE — 74176 CT ABD & PELVIS W/O CONTRAST: CPT | Mod: 26,MC

## 2024-07-26 PROCEDURE — 99284 EMERGENCY DEPT VISIT MOD MDM: CPT

## 2024-07-26 RX ORDER — SODIUM CHLORIDE 0.9 % (FLUSH) 0.9 %
1000 SYRINGE (ML) INJECTION ONCE
Refills: 0 | Status: COMPLETED | OUTPATIENT
Start: 2024-07-26 | End: 2024-07-26

## 2024-07-26 RX ORDER — KETOROLAC TROMETHAMINE 30 MG/ML
15 INJECTION, SOLUTION INTRAMUSCULAR ONCE
Refills: 0 | Status: DISCONTINUED | OUTPATIENT
Start: 2024-07-26 | End: 2024-07-26

## 2024-07-26 RX ADMIN — KETOROLAC TROMETHAMINE 15 MILLIGRAM(S): 30 INJECTION, SOLUTION INTRAMUSCULAR at 20:50

## 2024-07-26 RX ADMIN — Medication 1000 MILLILITER(S): at 23:19

## 2024-07-26 RX ADMIN — Medication 1000 MILLILITER(S): at 21:53

## 2024-07-26 RX ADMIN — KETOROLAC TROMETHAMINE 15 MILLIGRAM(S): 30 INJECTION, SOLUTION INTRAMUSCULAR at 20:18

## 2024-07-26 NOTE — ED PROVIDER NOTE - CLINICAL SUMMARY MEDICAL DECISION MAKING FREE TEXT BOX
69-year-old female presents with 2 weeks of lower abdominal pain.  PE as above   labs, urinalysis, pain control, CT abdomen and pelvis with IV contrast, reassess.

## 2024-07-26 NOTE — ED PROVIDER NOTE - NSFOLLOWUPINSTRUCTIONS_ED_ALL_ED_FT
You were seen in the emergency department for: abdominal pain  Your diagnosis for this visit was: urinary tract infection  From this ED visit you were prescribed: cefpodoxime  We recommend you follow up with: your primary care doctor.     Please return to the Emergency Department if you experience any of the following symptoms:   - Shortness of breath or trouble breathing  - Pressure, pain or tightness in the chest  - Face drooping, arm weakness or speech difficulty  - Persistence of severe vomiting  - Head injury or loss of consciousness  - Nonstop bleeding or an open wound    (1) Follow up with your primary care physician within the next 24-48 hours as discussed. In addition, we did not find evidence of a life threatening illness on your testing here today, but listed below are the specialists that will be necessary to see as an outpatient to continue the workup.  Please call the numbers listed below or [phone] to set up the necessary appointments.  (2) Take Tylenol (up to 1000mg or 1 g)  and/or Motrin (up to 600mg) up to every 6 hours as needed for pain.   (3) If you had an IV (intravenous) line placed, it was removed. Sometimes, after IV removal, that area can be tender for a few days; if it develops redness and swelling, those could be signs of infection; in which case, return to the Emergency Department for assessment.  (4) Please continue taking all of your home medications as directed.

## 2024-07-26 NOTE — ED ADULT NURSE NOTE - ALCOHOL PRE SCREEN (AUDIT - C)
[Statement Selected

## 2024-07-26 NOTE — ED PROVIDER NOTE - OBJECTIVE STATEMENT
69-year-old female with a past medical history of stroke, diabetes, hypertension, hyperlipidemia presents with a 2-week history of bilateral lower abdominal pain.  Patient states pain does worsen with movement.  States she has been taking Tylenol for the pain without any improvement.  States pain does not change with p.o. intake.  Denies fevers, nausea, vomiting, diarrhea, dysuria.  States the pain does sometimes radiate towards her lower back.  History of C-sections in the past

## 2024-07-26 NOTE — ED PROVIDER NOTE - PATIENT PORTAL LINK FT
You can access the FollowMyHealth Patient Portal offered by [facility] by registering at the following website: [email or web address]. By joining [employer]s FollowMyHealth portal, you will also be able to view your health information using other applications (apps) compatible with our system.

## 2024-07-26 NOTE — ED ADULT NURSE NOTE - OBJECTIVE STATEMENT
Pt presents to the ED with c/o of lower abdominal pain x 2 weeks radiating to the back and b/l LE. Ot denies any n/v/d, fever or constipation. pt reported taking Tylenol for pain without relief

## 2024-07-27 PROCEDURE — 74176 CT ABD & PELVIS W/O CONTRAST: CPT | Mod: MC

## 2024-07-27 PROCEDURE — 87186 SC STD MICRODIL/AGAR DIL: CPT

## 2024-07-27 PROCEDURE — 80053 COMPREHEN METABOLIC PANEL: CPT

## 2024-07-27 PROCEDURE — 83690 ASSAY OF LIPASE: CPT

## 2024-07-27 PROCEDURE — 81001 URINALYSIS AUTO W/SCOPE: CPT

## 2024-07-27 PROCEDURE — 83735 ASSAY OF MAGNESIUM: CPT

## 2024-07-27 PROCEDURE — 85025 COMPLETE CBC W/AUTO DIFF WBC: CPT

## 2024-07-27 PROCEDURE — 36415 COLL VENOUS BLD VENIPUNCTURE: CPT

## 2024-07-27 PROCEDURE — 87086 URINE CULTURE/COLONY COUNT: CPT

## 2024-07-27 PROCEDURE — 99284 EMERGENCY DEPT VISIT MOD MDM: CPT | Mod: 25

## 2024-07-27 PROCEDURE — 96374 THER/PROPH/DIAG INJ IV PUSH: CPT

## 2024-07-27 PROCEDURE — 96361 HYDRATE IV INFUSION ADD-ON: CPT

## 2024-07-27 RX ORDER — CEFPODOXIME PROXETIL 50 MG/5 ML
100 SUSPENSION, RECONSTITUTED, ORAL (ML) ORAL ONCE
Refills: 0 | Status: COMPLETED | OUTPATIENT
Start: 2024-07-27 | End: 2024-07-27

## 2024-07-27 RX ORDER — CEFTRIAXONE SODIUM 500 MG
1000 VIAL (EA) INJECTION ONCE
Refills: 0 | Status: DISCONTINUED | OUTPATIENT
Start: 2024-07-27 | End: 2024-07-27

## 2024-07-27 RX ORDER — CEFPODOXIME PROXETIL 50 MG/5 ML
1 SUSPENSION, RECONSTITUTED, ORAL (ML) ORAL
Qty: 14 | Refills: 0
Start: 2024-07-27 | End: 2024-08-02

## 2024-07-27 RX ADMIN — Medication 100 MILLIGRAM(S): at 00:43

## 2025-06-16 NOTE — ED ADULT NURSE NOTE - PAIN: PRESENCE, MLM
Last script shows was given 90 tabs but is a 2 times a day freq. On the baclofen. Verified with pharmacy and is due for fill as they only gave 60 tabs for 30 days  OARRS reviewed. UDS: no data  .   Last seen: 5/15/2025. Follow-up:   Future Appointments   Date Time Provider Department Center   7/2/2025  1:00 PM Adelia Pérez, APRN - CNP N [facility] - [place]      
complains of pain/discomfort

## 2025-07-22 ENCOUNTER — EMERGENCY (EMERGENCY)
Facility: HOSPITAL | Age: 70
LOS: 1 days | End: 2025-07-22
Attending: EMERGENCY MEDICINE
Payer: COMMERCIAL

## 2025-07-22 VITALS
SYSTOLIC BLOOD PRESSURE: 133 MMHG | WEIGHT: 234.79 LBS | TEMPERATURE: 99 F | DIASTOLIC BLOOD PRESSURE: 75 MMHG | OXYGEN SATURATION: 95 % | HEART RATE: 106 BPM | RESPIRATION RATE: 18 BRPM

## 2025-07-22 DIAGNOSIS — Z98.891 HISTORY OF UTERINE SCAR FROM PREVIOUS SURGERY: Chronic | ICD-10-CM

## 2025-07-22 PROCEDURE — 73630 X-RAY EXAM OF FOOT: CPT

## 2025-07-22 PROCEDURE — 73610 X-RAY EXAM OF ANKLE: CPT

## 2025-07-22 PROCEDURE — 99284 EMERGENCY DEPT VISIT MOD MDM: CPT

## 2025-07-22 PROCEDURE — 99284 EMERGENCY DEPT VISIT MOD MDM: CPT | Mod: 25

## 2025-07-22 PROCEDURE — 73590 X-RAY EXAM OF LOWER LEG: CPT

## 2025-07-22 RX ORDER — ACETAMINOPHEN 500 MG/5ML
650 LIQUID (ML) ORAL ONCE
Refills: 0 | Status: COMPLETED | OUTPATIENT
Start: 2025-07-22 | End: 2025-07-22

## 2025-07-22 RX ADMIN — Medication 650 MILLIGRAM(S): at 23:57

## 2025-07-23 VITALS
RESPIRATION RATE: 18 BRPM | TEMPERATURE: 98 F | SYSTOLIC BLOOD PRESSURE: 144 MMHG | OXYGEN SATURATION: 98 % | DIASTOLIC BLOOD PRESSURE: 85 MMHG | HEART RATE: 87 BPM

## 2025-07-23 PROCEDURE — 73610 X-RAY EXAM OF ANKLE: CPT | Mod: 26,LT

## 2025-07-23 PROCEDURE — 73630 X-RAY EXAM OF FOOT: CPT | Mod: 26,LT

## 2025-07-23 PROCEDURE — 73590 X-RAY EXAM OF LOWER LEG: CPT | Mod: 26,LT

## 2025-07-23 RX ORDER — ACETAMINOPHEN 500 MG/5ML
2 LIQUID (ML) ORAL
Qty: 20 | Refills: 0
Start: 2025-07-23

## 2025-07-23 NOTE — ED PROVIDER NOTE - PATIENT PORTAL LINK FT
You can access the FollowMyHealth Patient Portal offered by [facility] by registering at the following website: [email or web address]. By joining [employer]’s FollowMyHealth portal, you will also be able to view your health information using other applications (apps) compatible with our system.

## 2025-07-23 NOTE — ED PROVIDER NOTE - PHYSICAL EXAMINATION
No distress  Left bilateral malleoli are area tenderness to palpation, no erythema, mild swelling not warm to touch compared to other extremity, no bony deformities, anterior and posterior drawer test negative, pedal pulses intact, cap refill < 2 secs.   No calf tenderness.

## 2025-07-23 NOTE — ED ADULT NURSE NOTE - NSFALLUNIVINTERV_ED_ALL_ED
Bed/Stretcher in lowest position, wheels locked, appropriate side rails in place/Call bell, personal items and telephone in reach/Instruct patient to call for assistance before getting out of bed/chair/stretcher/Non-slip footwear applied when patient is off stretcher/[place] to call system/Physically safe environment - no spills, clutter or unnecessary equipment/Purposeful proactive rounding/Room/bathroom lighting operational, light cord in reach

## 2025-07-23 NOTE — ED PROVIDER NOTE - NSFOLLOWUPCLINICS_GEN_ALL_ED_FT
[facility]  Internal Medicine  [address] [place] [address]  Phone: [phone]  Fax: [phone]    [facility]  Podiatry/Wound Care  [address] [place] [address]  Phone: [phone]  Fax: [phone]

## 2025-07-23 NOTE — ED PROVIDER NOTE - OBJECTIVE STATEMENT
70-year-old female patient states she twisted her left lower extremity 3 weeks ago while on vacation.  No head trauma or LOC.  Patient with persistent tenderness to bilateral ankle and dorsal foot area.  Patient able to bear weight with cane.

## 2025-07-23 NOTE — ED ADULT NURSE NOTE - OBJECTIVE STATEMENT
BLE edema right more than left. Patient complains of foot pain however entire left is swollen and tight. Leg is warm to touch. Pedal pulses present.

## 2025-07-23 NOTE — ED PROVIDER NOTE - CLINICAL SUMMARY MEDICAL DECISION MAKING FREE TEXT BOX
Patient is neurovascularly intact and ankle splint.  Contact provided for podiatry follow-up.  Patient agrees with plan.  Pt is well appearing, has no new complaints and able to walk with normal gait. Pt is stable for discharge and follow up with medical doctor. Pt educated on care and need for follow up. Discussed anticipatory guidance and return precautions. Questions answered. I had a detailed discussion with the patient regarding the historical points, exam findings, and any diagnostic results supporting the discharge diagnosis.

## 2025-07-23 NOTE — ED PROVIDER NOTE - NSFOLLOWUPINSTRUCTIONS_ED_ALL_ED_FT
An ankle sprain happens when 1 or more ligaments in your ankle joint stretch or tear. Ligaments are tough tissues that connect bones. Ligaments support your joints and keep your bones in place.    DISCHARGE INSTRUCTIONS:    Return to the emergency department if:    You have severe pain in your ankle.    Your foot or toes are cold or numb.    Your ankle becomes more weak or unstable (wobbly).    You are unable to put any weight on your ankle or foot.    Your swelling has increased or returned.  Call your doctor if:    Your pain does not go away, even after treatment.    You have questions or concerns about your condition or care.  Medicines: You may need any of the following:    NSAIDs, such as ibuprofen, help decrease swelling, pain, and fever. This medicine is available with or without a doctor's order. NSAIDs can cause stomach bleeding or kidney problems in certain people. If you take blood thinner medicine, always ask your healthcare provider if NSAIDs are safe for you. Always read the medicine label and follow directions.    Acetaminophen decreases pain and fever. It is available without a doctor's order. Ask how much to take and how often to take it. Follow directions. Read the labels of all other medicines you are using to see if they also contain acetaminophen, or ask your doctor or pharmacist. Acetaminophen can cause liver damage if not taken correctly.    Prescription pain medicine may be given. Ask your healthcare provider how to take this medicine safely. Some prescription pain medicines contain acetaminophen. Do not take other medicines that contain acetaminophen without talking to your healthcare provider. Too much acetaminophen may cause liver damage. Prescription pain medicine may cause constipation. Ask your healthcare provider how to prevent or treat constipation.    Take your medicine as directed. Contact your healthcare provider if you think your medicine is not helping or if you have side effects. Tell your provider if you are allergic to any medicine. Keep a list of the medicines, vitamins, and herbs you take. Include the amounts, and when and why you take them. Bring the list or the pill bottles to follow-up visits. Carry your medicine list with you in case of an emergency.  Self-care:    Use support devices, such as a brace, cast, or splint, to limit your movement and protect your joint. You may need to use crutches to decrease your pain as you move around.    Go to physical therapy as directed. A physical therapist teaches you exercises to help improve movement and strength, and to decrease pain.    Rest your ankle so that it can heal. Return to normal activities as directed.    Apply ice on your ankle for 15 to 20 minutes every hour or as directed. Use an ice pack, or put crushed ice in a plastic bag. Cover the ice pack or bag with a towel before you put it on your injury. Ice helps prevent tissue damage and decreases swelling and pain.    Compress your ankle. Ask if you should wrap an elastic bandage around your injured ligament. An elastic bandage provides support and helps decrease swelling and movement so your joint can heal. Wear as long as directed.  How to Wrap an Elastic Bandage      Elevate your ankle above the level of your heart as often as you can. This will help decrease swelling and pain. Prop your ankle on pillows or blankets to keep it elevated comfortably.  Elevate Leg  Prevent another ankle sprain:    Let your ankle heal. Find out how long your ligament needs to heal. Do not do any physical activity until your healthcare provider says it is okay. If you start activity too soon, you may develop a more serious injury.    Warm up and stretch before you exercise or play sports. This helps your joints become strong and flexible.    Use the right equipment. Always wear shoes that fit well and are made for the activity that you are doing. You may also need ankle supports, elbow and knee pads, or braces.  Follow up with your doctor as directed: Write down your questions so you remember to ask them during your visits.